# Patient Record
Sex: FEMALE | Race: BLACK OR AFRICAN AMERICAN | NOT HISPANIC OR LATINO | Employment: UNEMPLOYED | URBAN - METROPOLITAN AREA
[De-identification: names, ages, dates, MRNs, and addresses within clinical notes are randomized per-mention and may not be internally consistent; named-entity substitution may affect disease eponyms.]

---

## 2016-11-02 LAB
ABO + RH BLD: NORMAL
ABO + RH BLD: NORMAL
BLD GP AB SCN SERPL QL: NORMAL
BLD GP AB SCN SERPL QL: NORMAL

## 2017-01-12 ENCOUNTER — HOSPITAL ENCOUNTER (OUTPATIENT)
Dept: ULTRASOUND IMAGING | Facility: CLINIC | Age: 42
Discharge: HOME | End: 2017-01-12
Attending: ADVANCED PRACTICE MIDWIFE | Admitting: OBSTETRICS & GYNECOLOGY
Payer: COMMERCIAL

## 2017-01-12 ENCOUNTER — OFFICE VISIT (OUTPATIENT)
Dept: MATERNAL FETAL MEDICINE | Facility: CLINIC | Age: 42
End: 2017-01-12
Attending: ADVANCED PRACTICE MIDWIFE
Payer: COMMERCIAL

## 2017-01-12 DIAGNOSIS — O09.293 HX OF PREECLAMPSIA, PRIOR PREGNANCY, CURRENTLY PREGNANT, THIRD TRIMESTER: ICD-10-CM

## 2017-01-12 DIAGNOSIS — O26.90 PREGNANCY RELATED CONDITION, UNSPECIFIED TRIMESTER: ICD-10-CM

## 2017-01-12 PROCEDURE — 76816 OB US FOLLOW-UP PER FETUS: CPT

## 2017-01-12 NOTE — PROGRESS NOTES
"Please see \"Imaging\" tab under \"Chart Review\" for details of today's US at the [facility].    Mik Cartagena MD  Maternal-Fetal Medicine      "

## 2017-01-13 ENCOUNTER — VIRTUAL VISIT (OUTPATIENT)
Dept: INTERPRETER SERVICES | Facility: CLINIC | Age: 42
End: 2017-01-13

## 2017-01-13 LAB — GLU GEST SCREEN 1HR 50G: 154

## 2017-01-20 LAB
GLU, 1 HOUR, 100 G: 177
GLU, 2 HOUR, 100 G: 109
GLU, 3 HOUR, 100 G: 110
HBV SURFACE AG SERPL QL IA: NORMAL
HIV 1+2 AB+HIV1 P24 AG SERPL QL IA: NORMAL
RUBELLA ANTIBODY IGG QUANTITATIVE: 1.1 IU/ML

## 2017-02-02 ENCOUNTER — OFFICE VISIT (OUTPATIENT)
Dept: MATERNAL FETAL MEDICINE | Facility: CLINIC | Age: 42
End: 2017-02-02
Attending: OBSTETRICS & GYNECOLOGY
Payer: COMMERCIAL

## 2017-02-02 ENCOUNTER — HOSPITAL ENCOUNTER (OUTPATIENT)
Dept: ULTRASOUND IMAGING | Facility: CLINIC | Age: 42
Discharge: HOME | End: 2017-02-02
Attending: OBSTETRICS & GYNECOLOGY | Admitting: OBSTETRICS & GYNECOLOGY
Payer: COMMERCIAL

## 2017-02-02 ENCOUNTER — OFFICE VISIT (OUTPATIENT)
Dept: INTERPRETER SERVICES | Facility: CLINIC | Age: 42
End: 2017-02-02

## 2017-02-02 DIAGNOSIS — O09.293 HX OF PREECLAMPSIA, PRIOR PREGNANCY, CURRENTLY PREGNANT, THIRD TRIMESTER: ICD-10-CM

## 2017-02-02 DIAGNOSIS — O09.523 AMA (ADVANCED MATERNAL AGE) MULTIGRAVIDA 35+, THIRD TRIMESTER: Primary | ICD-10-CM

## 2017-02-02 PROCEDURE — 76816 OB US FOLLOW-UP PER FETUS: CPT

## 2017-02-02 NOTE — PROGRESS NOTES
Please refer to ultrasound report under 'Imaging' Studies of 'Chart Review' tabs.    Chase Carver M.D.

## 2017-02-07 ENCOUNTER — TRANSFERRED RECORDS (OUTPATIENT)
Dept: HEALTH INFORMATION MANAGEMENT | Facility: CLINIC | Age: 42
End: 2017-02-07

## 2017-02-16 ENCOUNTER — TRANSFERRED RECORDS (OUTPATIENT)
Dept: HEALTH INFORMATION MANAGEMENT | Facility: CLINIC | Age: 42
End: 2017-02-16

## 2017-02-24 ENCOUNTER — OFFICE VISIT (OUTPATIENT)
Dept: OBGYN | Facility: CLINIC | Age: 42
End: 2017-02-24
Payer: COMMERCIAL

## 2017-02-24 VITALS
HEART RATE: 95 BPM | BODY MASS INDEX: 35.02 KG/M2 | SYSTOLIC BLOOD PRESSURE: 117 MMHG | HEIGHT: 61 IN | WEIGHT: 185.5 LBS | DIASTOLIC BLOOD PRESSURE: 72 MMHG

## 2017-02-24 DIAGNOSIS — O36.5990 ASYMMETRIC IUGR AFFECTING PREGNANCY, ANTEPARTUM: ICD-10-CM

## 2017-02-24 DIAGNOSIS — O09.523 AMA (ADVANCED MATERNAL AGE) MULTIGRAVIDA 35+, THIRD TRIMESTER: Primary | ICD-10-CM

## 2017-02-24 DIAGNOSIS — O09.93 HIGH-RISK PREGNANCY, THIRD TRIMESTER: Primary | ICD-10-CM

## 2017-02-24 PROBLEM — O99.810 PREGNANCY WITH ABNORMAL GLUCOSE TOLERANCE TEST (GTT): Status: ACTIVE | Noted: 2017-01-16

## 2017-02-24 PROCEDURE — 99212 OFFICE O/P EST SF 10 MIN: CPT | Mod: ZF

## 2017-02-24 PROCEDURE — T1013 SIGN LANG/ORAL INTERPRETER: HCPCS | Mod: U3,ZF

## 2017-02-24 NOTE — MR AVS SNAPSHOT
After Visit Summary   2017    Tricia Tamez    MRN: [id number]           Patient Information     Date Of Birth          1975        Visit Information        Provider Department      2017 2:45 PM Roslyn Corona; Nurse, [facility]        Today's Diagnoses     AMA (advanced maternal age) multigravida 35+, third trimester    -  1    Asymmetric IUGR affecting pregnancy, antepartum           Follow-ups after your visit        Who to contact     Please call your clinic at [phone] to:    Ask questions about your health    Make or cancel appointments    Discuss your medicines    Learn about your test results    Speak to your doctor   If you have compliments or concerns about an experience at your clinic, or if you wish to file a complaint, please contact [facility] Patient Relations at [phone] or email us at [email or web address]         Additional Information About Your Visit        MyChart Information     Delvert is an electronic gateway that provides easy, online access to your medical records. With Battlepro, you can request a clinic appointment, read your test results, renew a prescription or communicate with your care team.     To sign up for Delvert visit the website at [email or web address]   You will be asked to enter the access code listed below, as well as some personal information. Please follow the directions to create your username and password.     Your access code is: [email or web address]  Expires: 10/22/2017  9:03 AM     Your access code will  in 90 days. If you need help or a new code, please contact your [facility] or call [phone] for assistance.        Care EveryWhere ID     This is your Care EveryWhere ID. This could be used by other organizations to access your [place] medical records  [id number]        Your Vitals Were     Last Period                    06/24/2016            Blood Pressure from Last 3 Encounters:   04/18/17 126/75   04/13/17 119/79   04/10/17 123/80    Weight from Last 3 Encounters:   04/13/17 82.6 kg (182 lb)   04/10/17 82.6 kg (182 lb)   04/07/17 82.6 kg (182 lb)              Today, you had the following     No orders found for display       Primary Care Provider Office Phone # Fax #    Lilly Cartwright [phone]       [facility] [address]  [facility] [place] [facility] [address]        Equal Access to Services     ISAIAS GOMEZ : Hadradhika Wilburn, jose eduardo mejia, brady cordova, hubert gutierres . So [facility] [phone].    ATENCIÓN: Si habla español, tiene a lozada disposición servicios gratuitos de asistencia lingüística. Ll[facility] [phone].    We comply with applicable federal civil rights laws and [place] laws. We do not discriminate on the basis of race, color, national origin, age, disability sex, sexual orientation or gender identity.            Thank you!     Thank you for choosing [facility]  for your care. Our goal is always to provide you with excellent care. Hearing back from our patients is one way we can continue to improve our services. Please take a few minutes to complete the written survey that you may receive in the mail after your visit with us. Thank you!             Your Updated Medication List - Protect others around you: Learn how to safely use, store and throw away your medicines at www.disposemymeds.org.          This list is accurate as of: 2/24/17 11:59 PM.  Always use your most recent med list.                   Brand Name Dispense Instructions for use Diagnosis    prenatal multivitamin  plus iron 27-0.8 MG Tabs per tablet     30 tablet    Take 1 tablet by mouth daily    HRP (high risk pregnancy), third trimester       vitamin D 2000 UNITS Caps     90 capsule    Take 1 capsule by mouth daily Take one capsule daily.    Hypovitaminosis D

## 2017-02-24 NOTE — MR AVS SNAPSHOT
After Visit Summary   2/24/2017    Tricia Tamez    MRN: [id number]           Patient Information     Date Of Birth          1975        Visit Information        Provider Department      2/24/2017 3:15 PM Kelsey Corona Carrie Ann, MD [facility]        Today's Diagnoses     High-risk pregnancy, third trimester    -  1       Follow-ups after your visit        Your next 10 appointments already scheduled     Mar 03, 2017 10:15 AM SAVI CARRASCO US COMPRE SINGLE F/U with [id number]   [facility])    [address]  [facility] [id number] [phone]           Wear comfortable clothes and leave your valuables at home.            Mar 03, 2017 10:45 AM SAVI   Radiology MD with INOCENCIO CARRASCO MD   [facility])    [address]  [facility] [address] [phone]           Please arrive at the time given for your first appointment.  This visit is used internally to schedule the physician's time during your ultrasound.              Who to contact     Please call your clinic at [phone] to:    Ask questions about your health    Make or cancel appointments    Discuss your medicines    Learn about your test results    Speak to your doctor   If you have compliments or concerns about an experience at your clinic, or if you wish to file a complaint, please contact [facility] Patient Relations at [phone] or email us at [email or web address]         Additional Information About Your Visit        MyChart Information     [employer] is an electronic gateway that provides easy, online access to your medical records. With [employer] you can request a clinic appointment, read your test results, renew a prescription or communicate with your care team.     To sign up for [employer]  "visit the website at [email or web address]   You will be asked to enter the access code listed below, as well as some personal information. Please follow the directions to create your username and password.     Your access code is: [email or web address]  Expires: 2017  9:00 AM     Your access code will  in 90 days. If you need help or a new code, please contact your [facility] or call [phone] for assistance.        Care EveryWhere ID     This is your Care EveryWhere ID. This could be used by other organizations to access your [place] medical records  [id number]        Your Vitals Were     Pulse Height Last Period BMI (Body Mass Index)          95 1.549 m (5' 1\") 2016 35.05 kg/m2         Blood Pressure from Last 3 Encounters:   17 117/72   05/16/15 (!) 135/93   05/13/15 (!) 145/99    Weight from Last 3 Encounters:   17 84.1 kg (185 lb 8 oz)   05/14/15 83.5 kg (184 lb)   05/13/15 83.4 kg (183 lb 14.4 oz)              We Performed the Following     ABO and Rh     Antibody screen red cell     Glucose tolerance gest screen 1 hour     Glucose tolerance gest std 100 gm 3 hr        Primary Care Provider Office Phone # Fax #    Lilly Gabbie [phone]       [facility] [address] [place] [id number]        Thank you!     Thank you for choosing [facility]  for your care. Our goal is always to provide you with excellent care. Hearing back from our patients is one way we can continue to improve our services. Please take a few minutes to complete the written survey that you may receive in the mail after your visit with us. Thank you!             Your Updated Medication List - Protect others around you: Learn how to safely use, store and throw away your medicines at www.disposemymeds.org.          This list is accurate as of: 17  4:16 PM.  Always use your most recent med list.                   Brand Name " Dispense Instructions for use    * D 2000 2000 UNITS tablet   Generic drug:  cholecalciferol          * vitamin D 2000 UNITS Caps     90 capsule    Take 1 capsule by mouth daily Take one capsule daily.       diphenhydrAMINE 25 MG tablet    BENADRYL         ibuprofen 600 MG tablet    ADVIL/MOTRIN    30 tablet    Take 1 tablet (600 mg) by mouth every 6 hours as needed for moderate pain       * prenatal multivitamin  plus iron 27-0.8 MG Tabs per tablet      Take 1 tablet by mouth daily       * prenatal multivitamin  plus iron 27-0.8 MG Tabs per tablet     30 tablet    Take 1 tablet by mouth daily       * Notice:  This list has 4 medication(s) that are the same as other medications prescribed for you. Read the directions carefully, and ask your doctor or other care provider to review them with you.

## 2017-02-24 NOTE — PROGRESS NOTES
Transfer of care from [facility] at 35+0 for history of mild preeclampsia and grand multiparity.  Doing well. No concerns. States this baby feels larger than last.   Has been having u/s at [facility].   No hypertension this pregnancy  Patient Active Problem List   Diagnosis     High-risk pregnancy     Pregnancy with abnormal glucose tolerance test (GTT)     History of pre-eclampsia     Supervision of pregnancy with insufficient  care     Extreme poverty     Obstetric History       T7      TAB0   SAB0   E0   M0   L6       # Outcome Date GA Lbr Michael/2nd Weight Sex Delivery Anes PTL Lv   9 Current            8 Term 05/14/15 38w3d  2.268 kg (5 lb) F Vag-Spont None  Y      Apgar1:  9                Apgar5: 9   7 AB      AB, COMPLETE      6 Term         ND   5 Term         Y   4 Term         Y   3 Term         Y   2 Term         Y   1 Term         Y        Yokasta Morse

## 2017-02-25 ASSESSMENT — PATIENT HEALTH QUESTIONNAIRE - PHQ9: SUM OF ALL RESPONSES TO PHQ QUESTIONS 1-9: 0

## 2017-03-01 ENCOUNTER — OFFICE VISIT (OUTPATIENT)
Dept: OBGYN | Facility: CLINIC | Age: 42
End: 2017-03-01
Payer: COMMERCIAL

## 2017-03-01 VITALS
HEART RATE: 111 BPM | SYSTOLIC BLOOD PRESSURE: 118 MMHG | DIASTOLIC BLOOD PRESSURE: 76 MMHG | WEIGHT: 186 LBS | BODY MASS INDEX: 35.12 KG/M2 | HEIGHT: 61 IN

## 2017-03-01 DIAGNOSIS — O09.93 HRP (HIGH RISK PREGNANCY), THIRD TRIMESTER: Primary | ICD-10-CM

## 2017-03-01 PROCEDURE — T1013 SIGN LANG/ORAL INTERPRETER: HCPCS | Mod: U3,ZF

## 2017-03-01 PROCEDURE — 87653 STREP B DNA AMP PROBE: CPT | Performed by: OBSTETRICS & GYNECOLOGY

## 2017-03-01 PROCEDURE — 99212 OFFICE O/P EST SF 10 MIN: CPT | Mod: ZF

## 2017-03-01 ASSESSMENT — PAIN SCALES - GENERAL: PAINLEVEL: NO PAIN (0)

## 2017-03-01 NOTE — PROGRESS NOTES
"[facility] Clinic Visit  Return OBGYN    S:  Tricia Tamez is a 41 year old  at 35w5d by LMP c/w 20w3d US. She is a transfer of care from [facility] at 35 weeks for mild preeclampsia and grand multiparity. Pregnancy is complicated by above as well as AMA, failure of GCT but passed GTT, and latent TB history. She feels well today. She is accompanied by her  and has a Panamanian  present. She is feeling well. She denies any contractions, vaginal bleeding, loss of fluid, and has good fetal movement. She denies any concerning headaches, visual changes, or RUQ pain. Has been having u/s at [facility] and no hypertension this pregnancy. Of note, patient has not been taking baby aspirin as discussed previously.    PMH:  History of Preeclampsia  Grand multiparity  AMA  Failed GCT, Passed GTT  Latent TB- T spot+ on 10/2013 treated for 2 months only  Poverty    PSH:  Denies    Meds:  Iron and prenatal vitamin    Patient Active Problem List   Diagnosis     High-risk pregnancy     Pregnancy with abnormal glucose tolerance test (GTT)     History of pre-eclampsia     Supervision of pregnancy with insufficient  care     Extreme poverty     OB:  As per patient report, she has had 6 term vaginal deliveries with the last in 2015 delivered here    O:  Vitals:    17 1336   BP: 118/76   BP Location: Right arm   Patient Position: Chair   Cuff Size: Adult Regular   Pulse: 111   Weight: 84.4 kg (186 lb)   Height: 1.549 m (5' 1\")     Gen- NAD  Abdomen- FHTs 140s, FH 36cm  External genitalia- appears normal with physiologic appearing discharge of pregnancy    Prenatal labs:  2016: HIV NR, Hep B S Ag NR, Gonorrhea/chlamydia negative, AB+, negative antibody, RI, Hep B Immune, Neg RPR, baseline HELP labs normal. Failed GCT but passed GTT    US: Last growht US with [facility] at 47%    A/P:  Tricia Tamez is a 41 year old  at 35w5d by LMP c/w 20w3d US who is a transfer of care from [facility] at 35 weeks for " mild preeclampsia and grand multiparity. Pregnancy is complicated by above as well as AMA, failure of GCT but passed GTT, and latent TB history.    - GBS collected  - Return in 1 week for weekly OBGYN appointments until delivery  - Prenatal labs- as above. Scheduled for growth ultrasound on 3/3/17 and she was encouraged to keep this appointment  - History of mild preeclampsia- has not been taking ASA and declines taking it now. Benefits of this were discussed with her partner and her to help prevent preeclampsia; however, it was still declined. Patient is normotensive today  - Grand multiparity- would type and cross on admission to labor and delivery. She denies any history of PPH  - s/p flu and Tdap    Rhodareshma Juan MD   OBGYN, PGY-3  3/1/2017 1:39 PM    I agree with note as above.  Assessment and plan were jointly made.  Alana Beckford MD

## 2017-03-01 NOTE — MR AVS SNAPSHOT
After Visit Summary   3/1/2017    Tricia Tamez    MRN: [id number]           Patient Information     Date Of Birth          1975        Visit Information        Provider Department      3/1/2017 1:30 PM Roslyn Corona; Rhoda Juan MD [facility]        Today's Diagnoses     Acute posthemorrhagic anemia    -  1       Follow-ups after your visit        Follow-up notes from your care team     Return in about 1 week (around 3/8/2017) for Return OB visit.      Your next 10 appointments already scheduled     Mar 03, 2017 10:00 AM CST   [place] [facility] with [employer]    Services Department ([facility])    [address] [facility] [address]               Mar 03, 2017 10:15 AM CST   MFM US COMPRE SINGLE F/U with IVON   [facility] [place] ([facility])    [address]  [facility] [id number] [phone]           Wear comfortable clothes and leave your valuables at home.            Mar 03, 2017 10:45 AM CST   Radiology MD with UR DANILO LIRA   [facility] [place] ([facility])    [address]  [facility] [id number] [phone]           Please arrive at the time given for your first appointment.  This visit is used internally to schedule the physician's time during your ultrasound.            Mar 07, 2017 11:30 AM CST   RETURN OB with Beryl Wan MD   [facility] ([facility])    [place] [facility] [address] [address]  [address]  [facility] [address]   [phone]            Mar 15, 2017  2:00 PM CDT   RETURN OB with Beryl Wan MD   [facility] ([facility])    [place] [facility] [address]  [address]  [facility]  "[phone]            Mar 22, 2017  2:15 PM CDT   RETURN OB with Alana Beckford MD   [facility] ([facility])    [place] [facility] [address] [address]  [address]  [facility] [id number] [phone]              Who to contact     Please call your clinic at [phone] to:    Ask questions about your health    Make or cancel appointments    Discuss your medicines    Learn about your test results    Speak to your doctor   If you have compliments or concerns about an experience at your clinic, or if you wish to file a complaint, please contact [facility] Patient Relations at [phone] or email us at [email or web address]         Additional Information About Your Visit        [facility] Healthcare[facility]ISSOID Information     StreamOcean is an electronic gateway that provides easy, online access to your medical records. With StreamOcean, you can request a clinic appointment, read your test results, renew a prescription or communicate with your care team.     To sign up for StreamOcean visit the website at [email or web address]   You will be asked to enter the access code listed below, as well as some personal information. Please follow the directions to create your username and password.     Your access code is: [email or web address]  Expires: 2017  9:00 AM     Your access code will  in 90 days. If you need help or a new code, please contact your [facility] or call [phone] for assistance.        Care EveryWhere ID     This is your Care EveryWhere ID. This could be used by other organizations to access your [place] medical records  [id number]        Your Vitals Were     Pulse Height Last Period BMI (Body Mass Index)          111 1.549 m (5' 1\") 2016 35.14 kg/m2         Blood Pressure from Last 3 Encounters:   17 118/76   17 117/72   05/16/15 (!) 135/93    Weight from Last 3 " Encounters:   03/01/17 84.4 kg (186 lb)   02/24/17 84.1 kg (185 lb 8 oz)   05/14/15 83.5 kg (184 lb)              Today, you had the following     No orders found for display       Primary Care Provider Office Phone # Fax #    Lilly Cartwright [phone]       [facility] [address]  [facility] [address]        Thank you!     Thank you for choosing [facility]  for your care. Our goal is always to provide you with excellent care. Hearing back from our patients is one way we can continue to improve our services. Please take a few minutes to complete the written survey that you may receive in the mail after your visit with us. Thank you!             Your Updated Medication List - Protect others around you: Learn how to safely use, store and throw away your medicines at www.disposemymeds.org.          This list is accurate as of: 3/1/17  2:08 PM.  Always use your most recent med list.                   Brand Name Dispense Instructions for use    prenatal multivitamin  plus iron 27-0.8 MG Tabs per tablet     30 tablet    Take 1 tablet by mouth daily       vitamin D 2000 UNITS Caps     90 capsule    Take 1 capsule by mouth daily Take one capsule daily.

## 2017-03-02 LAB
GP B STREP DNA SPEC QL NAA+PROBE: NORMAL
SPECIMEN SOURCE: NORMAL

## 2017-03-03 ENCOUNTER — HOSPITAL ENCOUNTER (OUTPATIENT)
Dept: ULTRASOUND IMAGING | Facility: CLINIC | Age: 42
Discharge: HOME | End: 2017-03-03
Attending: OBSTETRICS & GYNECOLOGY | Admitting: OBSTETRICS & GYNECOLOGY
Payer: COMMERCIAL

## 2017-03-03 ENCOUNTER — OFFICE VISIT (OUTPATIENT)
Dept: MATERNAL FETAL MEDICINE | Facility: CLINIC | Age: 42
End: 2017-03-03
Attending: OBSTETRICS & GYNECOLOGY
Payer: COMMERCIAL

## 2017-03-03 DIAGNOSIS — O09.293 HX OF PREECLAMPSIA, PRIOR PREGNANCY, CURRENTLY PREGNANT, THIRD TRIMESTER: ICD-10-CM

## 2017-03-03 DIAGNOSIS — Z03.73 SUSPECTED FETAL ANOMALY NOT FOUND: Primary | ICD-10-CM

## 2017-03-03 DIAGNOSIS — O09.523 AMA (ADVANCED MATERNAL AGE) MULTIGRAVIDA 35+, THIRD TRIMESTER: ICD-10-CM

## 2017-03-03 PROCEDURE — 76819 FETAL BIOPHYS PROFIL W/O NST: CPT | Performed by: OBSTETRICS & GYNECOLOGY

## 2017-03-03 PROCEDURE — 76816 OB US FOLLOW-UP PER FETUS: CPT

## 2017-03-03 NOTE — PROGRESS NOTES
"Please see \"Imaging\" tab under \"Chart Review\" for details of today's US at the [facility].    Mik Cartagena MD  Maternal-Fetal Medicine      "

## 2017-03-03 NOTE — MR AVS SNAPSHOT
After Visit Summary   3/3/2017    Tricia Tamez    MRN: [id number]           Patient Information     Date Of Birth          1975        Visit Information        Provider Department      3/3/2017 10:45 AM Mik Cartagena MD [facility] Maternal Fetal Medicine [facility]        Today's Diagnoses     Suspected fetal anomaly not found    -  1       Follow-ups after your visit        Your next 10 appointments already scheduled     Mar 03, 2017 10:45 AM CST   Radiology MD with Mik Cartagena MD   [facility] Maternal Fetal Medicine [facility] ([facility])    [address]  [facility] [address]   [phone]           Please arrive at the time given for your first appointment.  This visit is used internally to schedule the physician's time during your ultrasound.            Mar 07, 2017 11:30 AM CST   RETURN OB with Beryl Wan MD   [facility] ([facility])    [place] [facility] [address] [address]  [address]  [facility] [address]   [phone]            Mar 15, 2017  2:00 PM CDT   RETURN OB with Beryl Wan MD   [facility] ([facility])    [place] [facility]  [address]  [facility] [address]   [phone]            Mar 22, 2017  2:15 PM CDT   RETURN OB with Alana Beckford MD   [facility] ([facility])    [place] [facility] [address] [address]  [address]  [facility] [address]   [phone]              Future tests that were ordered for you today     Open Future Orders        Priority Expected Expires Ordered    Maternal Fetal BPP Single Routine 3/7/2017 3/3/2018 3/3/2017    Maternal Fetal BPP Single Routine 3/10/2017 3/3/2018 3/3/2017    Maternal Fetal BPP Single Routine  3/3/2018 3/3/2017    Maternal Fetal BPP Single Routine  3/3/2018 3/3/2017    Maternal Fetal BPP  "Single Routine  3/3/2018 3/3/2017    Maternal Fetal BPP Single Routine  3/3/2018 3/3/2017            Who to contact     If you have questions or need follow up information about today's clinic visit or your schedule please contact [facility]ERNAL FETAL MEDICINE [facility] directly at [phone].  Normal or non-critical lab and imaging results will be communicated to you by MyChart, letter or phone within 4 business days after the clinic has received the results. If you do not hear from us within 7 days, please contact the clinic through CityGrohart or phone. If you have a critical or abnormal lab result, we will notify you by phone as soon as possible.  Submit refill requests through [email or web address] or call your pharmacy and they will forward the refill request to us. Please allow 3 business days for your refill to be completed.          Additional Information About Your Visit        CityGrohart Information     [email or web address] lets you send messages to your doctor, view your test results, renew your prescriptions, schedule appointments and more. To sign up, go to [email or web address] . Click on \"Log in\" on the left side of the screen, which will take you to the Welcome page. Then click on \"Sign up Now\" on the right side of the page.     You will be asked to enter the access code listed below, as well as some personal information. Please follow the directions to create your username and password.     Your access code is: [email or web address]  Expires: 2017  9:00 AM     Your access code will  in 90 days. If you need help or a new code, please call your [place] clinic or [phone].        Care EveryWhere ID     This is your Care EveryWhere ID. This could be used by other organizations to access your [place] medical records  [id number]        Your Vitals Were     Last Period                   2016            Blood Pressure from Last 3 Encounters:   17 118/76   17 117/72   05/16/15 (!) 135/93    Weight from " Last 3 Encounters:   03/01/17 84.4 kg (186 lb)   02/24/17 84.1 kg (185 lb 8 oz)   05/14/15 83.5 kg (184 lb)               Primary Care Provider Office Phone # Fax #    Lilly Cartwright [phone]       [facility] [address]  [facility] [address]        Thank you!     Thank you for choosing [facility]  for your care. Our goal is always to provide you with excellent care. Hearing back from our patients is one way we can continue to improve our services. Please take a few minutes to complete the written survey that you may receive in the mail after your visit with us. Thank you!             Your Updated Medication List - Protect others around you: Learn how to safely use, store and throw away your medicines at www.disposemymeds.org.          This list is accurate as of: 3/3/17 10:45 AM.  Always use your most recent med list.                   Brand Name Dispense Instructions for use    prenatal multivitamin  plus iron 27-0.8 MG Tabs per tablet     30 tablet    Take 1 tablet by mouth daily       vitamin D 2000 UNITS Caps     90 capsule    Take 1 capsule by mouth daily Take one capsule daily.

## 2017-03-07 ENCOUNTER — OFFICE VISIT (OUTPATIENT)
Dept: OBGYN | Facility: CLINIC | Age: 42
End: 2017-03-07
Attending: OBSTETRICS & GYNECOLOGY
Payer: COMMERCIAL

## 2017-03-07 VITALS
WEIGHT: 183.9 LBS | HEIGHT: 61 IN | HEART RATE: 132 BPM | BODY MASS INDEX: 34.72 KG/M2 | SYSTOLIC BLOOD PRESSURE: 132 MMHG | DIASTOLIC BLOOD PRESSURE: 74 MMHG

## 2017-03-07 DIAGNOSIS — O36.5990 ASYMMETRIC IUGR AFFECTING PREGNANCY, ANTEPARTUM: ICD-10-CM

## 2017-03-07 DIAGNOSIS — O09.93 HRP (HIGH RISK PREGNANCY), THIRD TRIMESTER: Primary | ICD-10-CM

## 2017-03-07 PROCEDURE — 99212 OFFICE O/P EST SF 10 MIN: CPT | Mod: ZF

## 2017-03-07 PROCEDURE — T1013 SIGN LANG/ORAL INTERPRETER: HCPCS | Mod: U3,ZF

## 2017-03-07 PROCEDURE — 76819 FETAL BIOPHYS PROFIL W/O NST: CPT | Mod: ZF

## 2017-03-07 NOTE — LETTER
3/7/2017       RE: Tricia Tamez  [address]  [facility] [address]     Dear Colleague,    Thank you for referring your patient, Tricia Tamez, to the [facility] at [facility]. Please see a copy of my visit note below.    41 year old,  , presents at 36 4/7 weeks in pregnancy complicated by asymmetric IUGR, AMA for biophysical assessment.    Fetal Breathing Movements (FBM): Normal - 2  Gross Body Movements (GBM): Normal - 2  Fetal Tone (FT): Normal - 2  AFV: Pocket of amniotic fluid > or = to 2 cm x 2 cm - 2  Quantitative Amniotic Fluid Volume Total: 11.8 cm      BPP 8/8.  UAR = 1.93 Normal.   MCA Not done.  NST Not done.     SIngle fetus in cephalic presentation.  Placenta anterior and grade 1.    Recommend continued twice weekly BPP w/ doppler assessments.    ORIANA Wilcox MD, FACOG  [facility]  OB/GYN    3/7/2017  3:16 PM        Again, thank you for allowing me to participate in the care of your patient.      Sincerely,    [place] [employer]

## 2017-03-07 NOTE — MR AVS SNAPSHOT
After Visit Summary   3/7/2017    Tricia Tamez    MRN: [id number]           Patient Information     Date Of Birth          1975        Visit Information        Provider Department      3/7/2017 1:30 PM [place] [employer]; [facility] ULTRASOUND II [facility]        Today's Diagnoses     Asymmetric IUGR affecting pregnancy, antepartum           Follow-ups after your visit        Your next 10 appointments already scheduled     Mar 14, 2017 11:00 AM CDT   ULTRASOUND with [facility] ULTRASOUND II   [facility] ([facility])    [place] [facility]  [facility] [address]   [phone]            Mar 15, 2017  2:00 PM CDT   RETURN OB with Beryl Wan MD   [facility] ([facility])    [place] [facility] [address]  [address]  [facility] [address]   [phone]            Mar 17, 2017 11:00 AM CDT   ULTRASOUND with [facility] ULTRASOUND   [facility] ([facility])    [place] [facility] [address]  [address]  [facility] [address]   [phone]            Mar 21, 2017 10:00 AM CDT   RETURN OB with Alana Brito MD   [facility] ([facility])    [place] [facility] [address]  [address]  [facility] [address]   [phone]            Mar 21, 2017 11:00 AM CDT   ULTRASOUND with [facility] ULTRASOUND   [facility] ([facility])    [place] [facility] [address]  [address]  [facility] [address]   [phone]            Mar 22, 2017  2:15 PM CDT   RETURN OB with Alana Beckford MD   [facility] ([facility])    [place] [facility] [address]  [address]  [facility] [address]   [phone]            Mar 24, 2017 11:00  AM CDT   ULTRASOUND with [facility] ULTRASOUND   [facility] ([facility])    [place] [facility] [address]  [address]  [facility] [address] [phone]            Mar 28, 2017 11:00 AM CDT   ULTRASOUND with [facility] ULTRASOUND   [facility] ([facility])    [place] [facility] [address]  [address]  [facility] [address]   [phone]            Mar 28, 2017 11:30 AM CDT   RETURN OB with Rika Vu MD   [facility] ([facility])    [place] [facility] [address]  [address]  [facility] [address] [phone]            Mar 31, 2017 11:00 AM CDT   ULTRASOUND with [facility] ULTRASOUND   [facility] ([facility])    [place] [facility] [address]  [address]  [facility] [address] [phone]              Who to contact     Please call your clinic at [phone] to:    Ask questions about your health    Make or cancel appointments    Discuss your medicines    Learn about your test results    Speak to your doctor   If you have compliments or concerns about an experience at your clinic, or if you wish to file a complaint, please contact [facility] Patient Relations at [phone] or email us at [email or web address]         Additional Information About Your Visit        Nimsofthart Information     [employer] is an electronic gateway that provides easy, online access to your medical records. With [employer], you can request a clinic appointment, read your test results, renew a prescription or communicate with your care team.     To sign up for [employer] visit the website at [email or web address]   You will be asked to enter the access code listed below, as well as some personal information. Please follow the directions to create your username and  password.     Your access code is: [email or web address]  Expires: 2017  9:00 AM     Your access code will  in 90 days. If you need help or a new code, please contact your [facility] or call [phone] for assistance.        Care EveryWhere ID     This is your Care EveryWhere ID. This could be used by other organizations to access your [place] medical records  [id number]        Your Vitals Were     Last Period                   2016            Blood Pressure from Last 3 Encounters:   17 132/74   17 118/76   17 117/72    Weight from Last 3 Encounters:   17 83.4 kg (183 lb 14.4 oz)   17 84.4 kg (186 lb)   17 84.1 kg (185 lb 8 oz)              We Performed the Following     BPP (Single) w/out NST (In Clinic)        Primary Care Provider Office Phone # Fax #    Lilly Cartwright [phone]       [address] [address]        Thank you!     Thank you for choosing [facility]  for your care. Our goal is always to provide you with excellent care. Hearing back from our patients is one way we can continue to improve our services. Please take a few minutes to complete the written survey that you may receive in the mail after your visit with us. Thank you!             Your Updated Medication List - Protect others around you: Learn how to safely use, store and throw away your medicines at www.disposemymeds.org.          This list is accurate as of: 3/7/17  2:13 PM.  Always use your most recent med list.                   Brand Name Dispense Instructions for use    prenatal multivitamin  plus iron 27-0.8 MG Tabs per tablet     30 tablet    Take 1 tablet by mouth daily       vitamin D 2000 UNITS Caps     90 capsule    Take 1 capsule by mouth daily Take one capsule daily.

## 2017-03-07 NOTE — MR AVS SNAPSHOT
After Visit Summary   3/7/2017    Tricia Tamez    MRN: [id number]           Patient Information     Date Of Birth          1975        Visit Information        Provider Department      3/7/2017 11:15 AM Roslyn Corona; Beryl Wna MD [facility]        Today's Diagnoses     Asymmetric IUGR affecting pregnancy, antepartum    -  1       Follow-ups after your visit        Follow-up notes from your care team     Return in about 1 week (around 3/14/2017) for ROBERTO.      Your next 10 appointments already scheduled     Mar 07, 2017  1:30 PM CST   ULTRASOUND with Assignments Open, [facility] ULTRASOUND II   [facility] ([facility])    [place] [facility] [address]  [address]  [facility] [address] [phone]            Mar 15, 2017  1:00 PM CDT   ULTRASOUND with [facility] ULTRASOUND   [facility] ([facility])    [place] [facility] [address]  [address]  [facility] [address] [phone]            Mar 15, 2017  2:00 PM CDT   RETURN OB with Beryl Wan MD   [facility] ([facility])    [place] [facility] [address]  [address]  [facility] [address] [phone]            Mar 22, 2017 11:00 AM CDT   ULTRASOUND with [facility] ULTRASOUND   [facility] ([facility])    [place] [facility] [address]  [address]  [facility] [address] [phone]            Mar 22, 2017  2:15 PM CDT   RETURN OB with Alana Beckford MD   [facility] ([facility])    [place] [facility] [address]  [address]  [facility] [address] [phone]              Future tests that were ordered for you today     Open Future Orders        Priority Expected Expires Ordered    BPP (Single) w/out NST (In Clinic)  "Routine 2017 2017 3/7/2017            Who to contact     Please call your clinic at [phone] to:    Ask questions about your health    Make or cancel appointments    Discuss your medicines    Learn about your test results    Speak to your doctor   If you have compliments or concerns about an experience at your clinic, or if you wish to file a complaint, please contact [facility] Patient Relations at [phone] or email us at [email or web address]         Additional Information About Your Visit        [employer] Information     [employer] is an electronic gateway that provides easy, online access to your medical records. With [employer], you can request a clinic appointment, read your test results, renew a prescription or communicate with your care team.     To sign up for [employer] visit the website at [email or web address]   You will be asked to enter the access code listed below, as well as some personal information. Please follow the directions to create your username and password.     Your access code is: [email or web address]  Expires: 2017  9:00 AM     Your access code will  in 90 days. If you need help or a new code, please contact your [facility] or call [phone] for assistance.        Care EveryWhere ID     This is your Care EveryWhere ID. This could be used by other organizations to access your [place] medical records  [id number]        Your Vitals Were     Pulse Height Last Period Breastfeeding? BMI (Body Mass Index)       132 1.549 m (5' 1\") 2016 No 34.75 kg/m2        Blood Pressure from Last 3 Encounters:   17 132/74   17 118/76   17 117/72    Weight from Last 3 Encounters:   17 83.4 kg (183 lb 14.4 oz)   17 84.4 kg (186 lb)   17 84.1 kg (185 lb 8 oz)               Primary Care Provider Office Phone # Fax #    Lilly Cartwright [phone]       [facility] " [address] [facility] [address]        Thank you!     Thank you for choosing [facility]  for your care. Our goal is always to provide you with excellent care. Hearing back from our patients is one way we can continue to improve our services. Please take a few minutes to complete the written survey that you may receive in the mail after your visit with us. Thank you!             Your Updated Medication List - Protect others around you: Learn how to safely use, store and throw away your medicines at www.disposemymeds.org.          This list is accurate as of: 3/7/17 12:14 PM.  Always use your most recent med list.                   Brand Name Dispense Instructions for use    prenatal multivitamin  plus iron 27-0.8 MG Tabs per tablet     30 tablet    Take 1 tablet by mouth daily       vitamin D 2000 UNITS Caps     90 capsule    Take 1 capsule by mouth daily Take one capsule daily.

## 2017-03-07 NOTE — PROGRESS NOTES
41 year old,  , presents at 36 4/7 weeks in pregnancy complicated by asymmetric IUGR, AMA for biophysical assessment.    Fetal Breathing Movements (FBM): Normal - 2  Gross Body Movements (GBM): Normal - 2  Fetal Tone (FT): Normal - 2  AFV: Pocket of amniotic fluid > or = to 2 cm x 2 cm - 2  Quantitative Amniotic Fluid Volume Total: 11.8 cm      BPP 8/8.  UAR = 1.93 Normal.   MCA Not done.  NST Not done.     SIngle fetus in cephalic presentation.  Placenta anterior and grade 1.    Recommend continued twice weekly BPP w/ doppler assessments.    ORIANA Wilcox MD, FACOG  [facility]  OB/GYN    3/7/2017  3:16 PM

## 2017-03-07 NOTE — NURSING NOTE
Chief Complaint   Patient presents with     Prenatal Care     36w4d       See CITLALY Dias 3/7/2017

## 2017-03-07 NOTE — PROGRESS NOTES
"Overall doing well. Reports good fetal movement, no cramping, bleeding, leaking fluid. Denies headache.   O: /74 (BP Location: Left arm, Patient Position: Chair)  Pulse 132  Ht 1.549 m (5' 1\")  Wt 83.4 kg (183 lb 14.4 oz)  LMP 2016  Breastfeeding? No  BMI 34.75 kg/m2  A/P: 41 year old  @36w4d with asymmetric IUGR, borderline oligo and h/p preeclampsia  Reviewed IUGR, low fluid, recommend IOL at 37 weeks. Discussed risks of IUFD with patient. She declines IOL, \"I'll wait for labor, its up to God.\" BPP today, needs 2x/week BPP. Reviewed kick counts.   Beryl Wan MD    "

## 2017-03-07 NOTE — LETTER
"3/7/2017       RE: Tricia Tamez   [address]  [facility] [address]     Dear Colleague,    Thank you for referring your patient, Tricia Tamez, to the [facility] at [facility]. Please see a copy of my visit note below.    Overall doing well. Reports good fetal movement, no cramping, bleeding, leaking fluid. Denies headache.   O: /74 (BP Location: Left arm, Patient Position: Chair)  Pulse 132  Ht 1.549 m (5' 1\")  Wt 83.4 kg (183 lb 14.4 oz)  LMP 2016  Breastfeeding? No  BMI 34.75 kg/m2  A/P: 41 year old  @36w4d with asymmetric IUGR, borderline oligo and h/p preeclampsia  Reviewed IUGR, low fluid, recommend IOL at 37 weeks. Discussed risks of IUFD with patient. She declines IOL, \"I'll wait for labor, its up to God.\" BPP today, needs 2x/week BPP. Reviewed kick counts.   Beryl Wan MD        "

## 2017-03-14 ENCOUNTER — OFFICE VISIT (OUTPATIENT)
Dept: OBGYN | Facility: CLINIC | Age: 42
End: 2017-03-14
Attending: OBSTETRICS & GYNECOLOGY
Payer: COMMERCIAL

## 2017-03-14 DIAGNOSIS — O09.523 AMA (ADVANCED MATERNAL AGE) MULTIGRAVIDA 35+, THIRD TRIMESTER: Primary | ICD-10-CM

## 2017-03-14 DIAGNOSIS — O36.5990 ASYMMETRIC IUGR AFFECTING PREGNANCY, ANTEPARTUM: ICD-10-CM

## 2017-03-14 PROCEDURE — 76820 UMBILICAL ARTERY ECHO: CPT | Mod: ZF

## 2017-03-14 PROCEDURE — 76819 FETAL BIOPHYS PROFIL W/O NST: CPT | Mod: ZF

## 2017-03-14 NOTE — PROGRESS NOTES
41 year old,  , presents at 37 4/7 weeks in pregnancy complicated by asymmetric IUGR, AMA for biophysical assessment.    Fetal Breathing Movements (FBM): Normal - 2  Gross Body Movements (GBM): Normal - 2  Fetal Tone (FT): Normal - 2  AFV: Pocket of amniotic fluid > or = to 2 cm x 2 cm - 2  Quantitative Amniotic Fluid Volume Total: 11.9 cm      BPP 8/8.  UAR = 2.14 Normal.   MCA Not done.  NST Not done.     Single fetus in cephalic presentation.  Placenta anterior and grade 1.    Recommend twice weekly BPPs and dopplers and serial growth u/s until delivered. Delivery has been recommended at 37 weeks and will continue to be recommended.     ORIANA Wilcox MD, FACOG  [facility]  OB/GYN    3/14/2017  1:51 PM

## 2017-03-14 NOTE — MR AVS SNAPSHOT
After Visit Summary   3/14/2017    Tricia Tamez    MRN: [id number]           Patient Information     Date Of Birth          1975        Visit Information        Provider Department      3/14/2017 10:45 AM Open, Assignments; [facility] ULTRASOUND II  Services Department        Today's Diagnoses     AMA (advanced maternal age) multigravida 35+, third trimester    -  1    Asymmetric IUGR affecting pregnancy, antepartum           Follow-ups after your visit        Your next 10 appointments already scheduled     Mar 15, 2017  1:45 PM CDT   RETURN OB with Beryl Wan MD, Assignments Open   [facility] ([facility])    [place] [facility] [address]  [address]  [facility] [address] [phone]            Mar 17, 2017 11:00 AM CDT   ULTRASOUND with [facility] ULTRASOUND   [facility] ([facility])    [place] [facility] [address]  [address]  [facility] [address] [phone]            Mar 21, 2017 10:00 AM CDT   RETURN OB with Alana Brito MD   [facility] ([facility])    [place] [facility] [address]  [address]  [facility] [address] [phone]            Mar 21, 2017 11:00 AM CDT   ULTRASOUND with [facility] ULTRASOUND   [facility] ([facility])    [place] [facility] [address]  [address]  [facility] [address] [phone]            Mar 22, 2017  2:15 PM CDT   RETURN OB with Alana Beckford MD   [facility] ([facility])    [place] [facility] [address]  [address]  [facility] [address] [phone]            Mar 24, 2017 11:00 AM CDT   ULTRASOUND with [facility] ULTRASOUND   [facility] ([facility])    [place] [facility] [address]  [address]  [facility] [address]   [phone]            Mar 28, 2017 11:00 AM CDT   ULTRASOUND with [facility] ULTRASOUND   [facility] ([facility])    [place] [facility] [address]  [address]  [facility] [address]   [phone]            Mar 28, 2017 11:30 AM CDT   RETURN OB with Rika Vu MD   [facility] ([facility])    [place] [facility] [address]  [address]  [facility] [address]   [phone]            Mar 31, 2017 11:00 AM CDT   ULTRASOUND with [facility] ULTRASOUND   [facility] ([facility])    [place] [facility] [address]  [address]  [facility] [address]   [phone]              Future tests that were ordered for you today     Open Standing Orders        Priority Remaining Interval Expires Ordered    BPP (Single) w/out NST (In Clinic) Routine 5/6  7/12/2017 3/14/2017    OB Doppler - UAR (Single)  (In Clinic) Routine 5/6  7/12/2017 3/14/2017            Who to contact     Please call your clinic at [phone] to:    Ask questions about your health    Make or cancel appointments    Discuss your medicines    Learn about your test results    Speak to your doctor   If you have compliments or concerns about an experience at your clinic, or if you wish to file a complaint, please contact [facility] Phys[facility] Patient Relations at [phone] or email us at [email or web address]         Additional Information About Your Visit        [employer] Information     [employer] is an electronic gateway that provides easy, online access to your medical records. With [employer], you can request a clinic appointment, read your test results, renew a prescription or communicate with your care team.     To sign up for [employer] visit the website at [email or web address]   You will be asked to enter the access  code listed below, as well as some personal information. Please follow the directions to create your username and password.     Your access code is: [email or web address]  Expires: 2017 10:00 AM     Your access code will  in 90 days. If you need help or a new code, please contact your [facility] Clinic or call [phone] for assistance.        Care EveryWhere ID     This is your Care EveryWhere ID. This could be used by other organizations to access your [place] medical records  [id number]        Your Vitals Were     Last Period                   2016            Blood Pressure from Last 3 Encounters:   17 132/74   17 118/76   17 117/72    Weight from Last 3 Encounters:   17 83.4 kg (183 lb 14.4 oz)   17 84.4 kg (186 lb)   17 84.1 kg (185 lb 8 oz)              We Performed the Following     BPP (Single) w/out NST (In Clinic)     OB Doppler - UAR (Single)  (In Clinic)        Primary Care Provider Office Phone # Fax #    Lilly Gabbie [phone]       [address] [facility] [address]        Thank you!     Thank you for choosing [facility]  for your care. Our goal is always to provide you with excellent care. Hearing back from our patients is one way we can continue to improve our services. Please take a few minutes to complete the written survey that you may receive in the mail after your visit with us. Thank you!             Your Updated Medication List - Protect others around you: Learn how to safely use, store and throw away your medicines at www.disposemymeds.org.          This list is accurate as of: 3/14/17  1:52 PM.  Always use your most recent med list.                   Brand Name Dispense Instructions for use    prenatal multivitamin  plus iron 27-0.8 MG Tabs per tablet     30 tablet    Take 1 tablet by mouth daily       vitamin D 2000 UNITS Caps     90 capsule    Take 1  capsule by mouth daily Take one capsule daily.

## 2017-03-14 NOTE — LETTER
3/14/2017     RE: Tricia Tamez  [address]  [facility] [address]     Dear Colleague,    Thank you for referring your patient, Tricia Tamez, to the [facility] at [facility]. Please see a copy of my visit note below.    41 year old,  , presents at 37 4/7 weeks in pregnancy complicated by asymmetric IUGR, AMA for biophysical assessment.    Fetal Breathing Movements (FBM): Normal - 2  Gross Body Movements (GBM): Normal - 2  Fetal Tone (FT): Normal - 2  AFV: Pocket of amniotic fluid > or = to 2 cm x 2 cm - 2  Quantitative Amniotic Fluid Volume Total: 11.9 cm      BPP 8/8.  UAR = 2.14 Normal.   MCA Not done.  NST Not done.     Single fetus in cephalic presentation.  Placenta anterior and grade 1.    Recommend twice weekly BPPs and dopplers and serial growth u/s until delivered. Delivery has been recommended at 37 weeks and will continue to be recommended.     ORIANA Wilcox MD, FACOG  [facility]  OB/GYN    3/14/2017  1:51 PM

## 2017-03-15 ENCOUNTER — OFFICE VISIT (OUTPATIENT)
Dept: OBGYN | Facility: CLINIC | Age: 42
End: 2017-03-15
Attending: OBSTETRICS & GYNECOLOGY
Payer: COMMERCIAL

## 2017-03-15 VITALS
SYSTOLIC BLOOD PRESSURE: 114 MMHG | HEIGHT: 61 IN | BODY MASS INDEX: 35.17 KG/M2 | WEIGHT: 186.3 LBS | HEART RATE: 132 BPM | DIASTOLIC BLOOD PRESSURE: 75 MMHG

## 2017-03-15 DIAGNOSIS — O09.93 HIGH-RISK PREGNANCY, THIRD TRIMESTER: Primary | ICD-10-CM

## 2017-03-15 PROCEDURE — 99212 OFFICE O/P EST SF 10 MIN: CPT | Mod: ZF

## 2017-03-15 NOTE — LETTER
"3/15/2017       RE: Tricia Tamez   [address]  [facility] [address]     Dear Colleague,    Thank you for referring your patient, Tricia Tamez, to the [facility] at [facility]. Please see a copy of my visit note below.    Doing well. Good movement, no ctx, lof, vb. Denies headache.   O: /75 (BP Location: Left arm, Patient Position: Chair, Cuff Size: Adult Large)  Pulse 132  Ht 1.549 m (5' 1\")  Wt 84.5 kg (186 lb 4.8 oz)  LMP 2016  Breastfeeding? No  BMI 35.2 kg/m2  A/P: 41 year old  @37w5d HRP  Asymmetric IUGR: Previously with borderline oligo, now normal. Declines IOL, BPP yesterday normal. Is doing 2x/week testing. Reviewed kick counting. Discussed IOL if fluid drops.   H/o preeclampsia: Normal BP, no symptoms. Continue to monitor.   RTC 1 week  Beryl Wan MD      "

## 2017-03-15 NOTE — MR AVS SNAPSHOT
After Visit Summary   3/15/2017    Tricia Tamez    MRN: [id number]           Patient Information     Date Of Birth          1975        Visit Information        Provider Department      3/15/2017 1:45 PM Beryl Wan MD; LANGUAGE [facility]        Today's Diagnoses     High-risk pregnancy, third trimester    -  1       Follow-ups after your visit        Follow-up notes from your care team     Return in about 1 week (around 3/22/2017).      Your next 10 appointments already scheduled     Mar 17, 2017 10:45 AM CDT   [facility] LANGUAGE SERVICES   [facility] ([facility])    [facility] [address]  [facility] [address] [phone]            Mar 21, 2017 10:00 AM CDT   RETURN OB with Alana Brito MD   [facility] ([facility])    [facility] [address]  [address]  [facility] [address] [phone]            Mar 21, 2017 11:00 AM CDT   [facility]   [facility] ([facility])    [facility] [address]  [address]  [facility] [address] [phone]            Mar 22, 2017  2:15 PM CDT   RETURN OB with Alana Beckford MD   [facility] ([facility])    [facility] [address]  [address]  [facility] [id number] [phone]            Mar 24, 2017 11:00 AM CDT   [facility]   [facility] ([facility])    [facility] [address]  [address]  [facility] [address] [phone]            Mar 28, 2017 11:00 AM CDT   [facility]   [facility] ([facility])    [facility] [address]  [address]  [facility] [address]   [phone]            Mar 28, 2017 11:30 AM CDT   RETURN OB with Rika Vu MD   [facility] ([facility])    [facility] [address]  [address]  [facility] [address]   [phone]            Mar 31, 2017 11:00 AM CDT   [email or web address]OUND   [facility] ([facility])    [facility] [address]  [address]  [facility] [address]   [phone]            Apr 04, 2017 11:00 AM CDT   UL[facility]ASOUND with [email or web address] ULTRASOUND II   [facility] ([facility])    [facility] [address]  [address]  [facility] [address] [phone]              Future tests that were ordered for you today     Open Standing Orders        Priority Remaining Interval Expires Ordered    BPP (Single) w/out NST (In Clinic) Routine 5/6  7/12/2017 3/14/2017    OB Doppler - UAR (Single)  (In Clinic) Routine 5/6  7/12/2017 3/14/2017            Who to contact     Please call your clinic at [phone] to:    Ask questions about your health    Make or cancel appointments    Discuss your medicines    Learn about your test results    Speak to your doctor   If you have compliments or concerns about an experience at your clinic, or if you wish to file a complaint, please contact [facility] Patient Relations at [phone] or email us at [email or web address]         Additional Information About Your Visit        Giveohart Information     Giveohart is an electronic gateway that provides easy, online access to your medical records. With Transphorm, you can request a clinic appointment, read your test results, renew a prescription or communicate with your care team.     To sign up for [employer] visit the website at [email or web address]   You will be asked to  "enter the access code listed below, as well as some personal information. Please follow the directions to create your username and password.     Your access code is: [email or web address]  Expires: 2017 10:00 AM     Your access code will  in 90 days. If you need help or a new code, please contact your [facility] or call [phone] for assistance.        Care EveryWhere ID     This is your Care EveryWhere ID. This could be used by other organizations to access your [place] medical records  [id number]        Your Vitals Were     Pulse Height Last Period Breastfeeding? BMI (Body Mass Index)       132 1.549 m (5' 1\") 2016 No 35.2 kg/m2        Blood Pressure from Last 3 Encounters:   03/15/17 114/75   17 132/74   17 118/76    Weight from Last 3 Encounters:   03/15/17 84.5 kg (186 lb 4.8 oz)   17 83.4 kg (183 lb 14.4 oz)   17 84.4 kg (186 lb)              Today, you had the following     No orders found for display       Primary Care Provider Office Phone # Fax #    Lilly Cartwright [phone]       [place] [address]        Thank you!     Thank you for choosing [facility]  for your care. Our goal is always to provide you with excellent care. Hearing back from our patients is one way we can continue to improve our services. Please take a few minutes to complete the written survey that you may receive in the mail after your visit with us. Thank you!             Your Updated Medication List - Protect others around you: Learn how to safely use, store and throw away your medicines at www.disposemymeds.org.          This list is accurate as of: 3/15/17  2:27 PM.  Always use your most recent med list.                   Brand Name Dispense Instructions for use    prenatal multivitamin  plus iron 27-0.8 MG Tabs per tablet     30 tablet    Take 1 tablet by mouth daily       vitamin D  " UNITS Caps     90 capsule    Take 1 capsule by mouth daily Take one capsule daily.

## 2017-03-15 NOTE — PROGRESS NOTES
"Doing well. Good movement, no ctx, lof, vb. Denies headache.   O: /75 (BP Location: Left arm, Patient Position: Chair, Cuff Size: Adult Large)  Pulse 132  Ht 1.549 m (5' 1\")  Wt 84.5 kg (186 lb 4.8 oz)  LMP 2016  Breastfeeding? No  BMI 35.2 kg/m2  A/P: 41 year old  @37w5d HRP  Asymmetric IUGR: Previously with borderline oligo, now normal. Declines IOL, BPP yesterday normal. Is doing 2x/week testing. Reviewed kick counting. Discussed IOL if fluid drops.   H/o preeclampsia: Normal BP, no symptoms. Continue to monitor.   RTC 1 week  Beryl Wan MD    "

## 2017-03-15 NOTE — NURSING NOTE
Chief Complaint   Patient presents with     Prenatal Care     37w5d       See CITLALY Dias 3/15/2017

## 2017-03-17 ENCOUNTER — OFFICE VISIT (OUTPATIENT)
Dept: OBGYN | Facility: CLINIC | Age: 42
End: 2017-03-17
Attending: OBSTETRICS & GYNECOLOGY
Payer: COMMERCIAL

## 2017-03-17 DIAGNOSIS — O09.523 AMA (ADVANCED MATERNAL AGE) MULTIGRAVIDA 35+, THIRD TRIMESTER: ICD-10-CM

## 2017-03-17 DIAGNOSIS — O36.5990 ASYMMETRIC IUGR AFFECTING PREGNANCY, ANTEPARTUM: ICD-10-CM

## 2017-03-17 PROCEDURE — 76819 FETAL BIOPHYS PROFIL W/O NST: CPT | Mod: ZF

## 2017-03-17 NOTE — PROGRESS NOTES
41 year old,  , presents at 38 0/7 weeks in pregnancy complicated by asymmetric IUGR, AMA  for biophysical assessment.    Fetal Breathing Movements (FBM): Normal - 2  Gross Body Movements (GBM): Normal - 2  Fetal Tone (FT): Normal - 2  AFV: Pocket of amniotic fluid > or = to 2 cm x 2 cm - 2  Quantitative Amniotic Fluid Volume Total: 11.93 cm      BPP 8/8.  FHR = 158bpm Normal.   MCA Not done.  NST Not done.     Single fetus in cephalic presentation.  Placenta anterior and grade 1.    Normal LIBERTY today.  Recommend biweekly assessment.    ORIANA Rodney MD MPH

## 2017-03-17 NOTE — MR AVS SNAPSHOT
After Visit Summary   3/17/2017    Tricia Tamez    MRN: [id number]           Patient Information     Date Of Birth          1975        Visit Information        Provider Department      3/17/2017 10:45 AM [facility] LANGUAGE SERVICES; [facility] ULTRASOUND [facility]        Today's Diagnoses     AMA (advanced maternal age) multigravida 35+, third trimester        Asymmetric IUGR affecting pregnancy, antepartum           Follow-ups after your visit        Your next 10 appointments already scheduled     Mar 21, 2017 10:00 AM CDT   RETURN OB with Alana Brito MD, Roslyn Corona   [facility] ([facility])    [place] [facility] [address]  [address]  [facility] [address] [phone]            Mar 21, 2017 11:00 AM CDT   ULTRASOUND with Roslyn Corona, [facility] ULTRASOUND   [facility] ([facility])    [place] [facility] [address]  [address]  [facility] [address] [phone]            Mar 22, 2017  2:15 PM CDT   RETURN OB with Alana Beckford MD   [facility] ([facility])    [place] [facility] [address]  [address]  [facility] [address] [phone]            Mar 24, 2017 11:00 AM CDT   ULTRASOUND with [facility] ULTRASOUND   [facility] ([facility])    [place] [facility] [address]  [address]  [facility] [address] [phone]            Mar 28, 2017 11:00 AM CDT   ULTRASOUND with [facility] ULTRASOUND   [facility] ([facility])    [place] [facility] [address]  [address]  [facility] [address] [phone]            Mar 28, 2017 11:30 AM CDT   RETURN OB with Rika Vu MD   [facility] ([facility])    [place] [facility] [address]  [address]  [address]  [facility] [address]   [phone]            Mar 31, 2017 11:00 AM CDT   ULTRASOUND with [facility] ULTRASOUND   [facility] ([facility])    [place] [facility] [address]  [address]  [facility] [address]   [phone]            2017 11:00 AM CDT   ULTRASOUND with [facility] ULTRASOUND II   [facility] ([facility])    [place] [facility] [address]  [address]  [facility] [address] [phone]              Who to contact     Please call your clinic at [phone] to:    Ask questions about your health    Make or cancel appointments    Discuss your medicines    Learn about your test results    Speak to your doctor   If you have compliments or concerns about an experience at your clinic, or if you wish to file a complaint, please contact [facility] Patient Relations at [phone] or email us at [email or web address]         Additional Information About Your Visit        SignalDemandharThrill On Information     [employer] is an electronic gateway that provides easy, online access to your medical records. With CoverHound, you can request a clinic appointment, read your test results, renew a prescription or communicate with your care team.     To sign up for [employer] visit the website at [email or web address]   You will be asked to enter the access code listed below, as well as some personal information. Please follow the directions to create your username and password.     Your access code is: [email or web address]  Expires: 2017 10:00 AM     Your access code will  in 90 days. If you need help or a new code, please contact your [facility] or call [phone] for assistance.        Care EveryWhere ID     This is your Care EveryWhere ID. This could be used by other organizations to access your [facility]  records  [id number]        Your Vitals Were     Last Period                   06/24/2016            Blood Pressure from Last 3 Encounters:   03/15/17 114/75   03/07/17 132/74   03/01/17 118/76    Weight from Last 3 Encounters:   03/15/17 84.5 kg (186 lb 4.8 oz)   03/07/17 83.4 kg (183 lb 14.4 oz)   03/01/17 84.4 kg (186 lb)              We Performed the Following     BPP (Single) w/out NST (In Clinic)        Primary Care Provider Office Phone # Fax Malik Cartwright [phone]       [facility] [address]  [facility] [address]        Thank you!     Thank you for choosing [facility]  for your care. Our goal is always to provide you with excellent care. Hearing back from our patients is one way we can continue to improve our services. Please take a few minutes to complete the written survey that you may receive in the mail after your visit with us. Thank you!             Your Updated Medication List - Protect others around you: Learn how to safely use, store and throw away your medicines at www.disposemymeds.org.          This list is accurate as of: 3/17/17  1:51 PM.  Always use your most recent med list.                   Brand Name Dispense Instructions for use    prenatal multivitamin  plus iron 27-0.8 MG Tabs per tablet     30 tablet    Take 1 tablet by mouth daily       vitamin D 2000 UNITS Caps     90 capsule    Take 1 capsule by mouth daily Take one capsule daily.

## 2017-03-17 NOTE — LETTER
3/17/2017       RE: Tricia Tamez  [address]  [facility] [address]     Dear Colleague,    Thank you for referring your patient, Tricia Tamez, to the [facility] at [facility]. Please see a copy of my visit note below.    41 year old,  , presents at 38 0/7 weeks in pregnancy complicated by asymmetric IUGR, AMA  for biophysical assessment.    Fetal Breathing Movements (FBM): Normal - 2  Gross Body Movements (GBM): Normal - 2  Fetal Tone (FT): Normal - 2  AFV: Pocket of amniotic fluid > or = to 2 cm x 2 cm - 2  Quantitative Amniotic Fluid Volume Total: 11.93 cm      BPP 8/8.  FHR = 158bpm Normal.   MCA Not done.  NST Not done.     Single fetus in cephalic presentation.  Placenta anterior and grade 1.    Normal LIBERTY today.  Recommend biweekly assessment.    ORIANA Rodney MD MPH      [facility] LANGUAGE SERVICES

## 2017-03-21 ENCOUNTER — OFFICE VISIT (OUTPATIENT)
Dept: OBGYN | Facility: CLINIC | Age: 42
End: 2017-03-21
Attending: OBSTETRICS & GYNECOLOGY
Payer: COMMERCIAL

## 2017-03-21 VITALS
DIASTOLIC BLOOD PRESSURE: 80 MMHG | BODY MASS INDEX: 35.11 KG/M2 | SYSTOLIC BLOOD PRESSURE: 130 MMHG | WEIGHT: 185.8 LBS | HEART RATE: 120 BPM

## 2017-03-21 DIAGNOSIS — O36.5990 ASYMMETRIC IUGR AFFECTING PREGNANCY, ANTEPARTUM: ICD-10-CM

## 2017-03-21 DIAGNOSIS — O09.523 AMA (ADVANCED MATERNAL AGE) MULTIGRAVIDA 35+, THIRD TRIMESTER: ICD-10-CM

## 2017-03-21 DIAGNOSIS — O09.523 ELDERLY MULTIGRAVIDA IN THIRD TRIMESTER: ICD-10-CM

## 2017-03-21 DIAGNOSIS — O09.93 HIGH-RISK PREGNANCY, THIRD TRIMESTER: Primary | ICD-10-CM

## 2017-03-21 PROCEDURE — 99212 OFFICE O/P EST SF 10 MIN: CPT | Mod: ZF

## 2017-03-21 PROCEDURE — 76820 UMBILICAL ARTERY ECHO: CPT | Mod: ZF

## 2017-03-21 PROCEDURE — 76819 FETAL BIOPHYS PROFIL W/O NST: CPT | Mod: ZF

## 2017-03-21 PROCEDURE — T1013 SIGN LANG/ORAL INTERPRETER: HCPCS | Mod: U3,ZF

## 2017-03-21 ASSESSMENT — PAIN SCALES - GENERAL: PAINLEVEL: NO PAIN (0)

## 2017-03-21 NOTE — PROGRESS NOTES
Subjective:     41 year old  at 38w4d presents for routine prenatal visit.            no vaginal bleeding or leakage of fluid.  no contractions.  good fetal movement.        No HA, visual changes, RUQ or epigastric pain.   Patient concerns: continues to decline induction for asymmetric IUGR and history of oligohydramnios (most recent LIBERTY normal)  Feeling well overall.  Declines cervical exam today.  Objective:  Vitals:    17 1025   BP: 130/80   Pulse: 120   Weight: 84.3 kg (185 lb 12.8 oz)    See OB flowsheet  Assessment/Plan     Encounter Diagnoses   Name Primary?     High-risk pregnancy, third trimester Yes     Asymmetric IUGR affecting pregnancy, antepartum      Elderly multigravida in third trimester      No orders of the defined types were placed in this encounter.    No orders of the defined types were placed in this encounter.    - Reviewed continued twice weekly testing and induction of labor at 39 weeks, she declines this now.   - Reviewed why/how to contact provider if headache/visual changes/RUQ or epigastric pain, decreased fetal movement, vaginal bleeding, leakage of fluid or strong/regular contractions.  BPP today and later this week    Return to clinic in 1 week and prn if questions or concerns.   Alana Brito MD

## 2017-03-21 NOTE — PROGRESS NOTES
CC/HPI:   Tricia Tamez is a 41 year old female  who presents today for her annual exam. She is currently using {CONTRACEPTIVE:5051}  and would like to continue with this method of birth control.    HISTORIES:  Patient Active Problem List   Diagnosis     High-risk pregnancy     Pregnancy with abnormal glucose tolerance test (GTT)     History of pre-eclampsia     Supervision of pregnancy with insufficient  care     Extreme poverty     No past medical history on file.  No past surgical history on file.  Current Outpatient Prescriptions   Medication Sig Dispense Refill     Cholecalciferol (VITAMIN D) 2000 UNITS CAPS Take 1 capsule by mouth daily Take one capsule daily. 90 capsule 3     Prenatal Vit-Fe Fumarate-FA (PRENATAL MULTIVITAMIN  PLUS IRON) 27-0.8 MG TABS Take 1 tablet by mouth daily 30 tablet 3     No Known Allergies  Social History     Social History     Marital status:      Spouse name: N/A     Number of children: N/A     Years of education: N/A     Occupational History     Not on file.     Social History Main Topics     Smoking status: Never Smoker     Smokeless tobacco: Never Used     Alcohol use No     Drug use: No     Sexual activity: Yes     Partners: Male     Other Topics Concern     Not on file     Social History Narrative    How much exercise per week? none    How much calcium per day? unknown       How much caffeine per day? none    How much vitamin D per day? supplement    Do you/your family wear seatbelts?  Yes    Do you/your family use safety helmets? Yes    Do you/your family use sunscreen? No    Do you/your family keep firearms in the home? No    Do you/your family have a smoke detector(s)? Yes        Do you feel safe in your home? Yes    Has anyone ever touched you in an unwanted manner? No     Explain     Updated  BILLY Mccullough          No family history on file.       Gyn Hx:   Patient's last menstrual period was 2016.  Menses:   {:263755}    Review Of Systems:  {ROS  "NORMAL:417875::\"C: NEGATIVE for fever, chills\",\"E: NEGATIVE for vision changes \",\"R: NEGATIVE for significant cough or SOB\",\"CV: NEGATIVE for chest pain, palpitations \",\"GI: NEGATIVE for nausea, abdominal pain, heartburn, or change in bowel habits\",\": NEGATIVE for frequency, dysuria, or hematuria\",\"M: NEGATIVE for significant arthralgias or myalgia\",\"N: NEGATIVE for weakness, dizziness or paresthesias or headache\"}    EXAM:  /80  Pulse 120  Wt 84.3 kg (185 lb 12.8 oz)  LMP 06/24/2016  Breastfeeding? No  BMI 35.11 kg/m2  Body mass index is 35.11 kg/(m^2).    General - pleasant female in no acute distress.  Skin - no suspicious lesions or rashes  EENT-  PERRLA, euthyroid with out palpable nodules  Neck - supple without lymphadenopathy.  Lungs - clear to auscultation bilaterally.  Heart - regular rate and rhythm without murmur.  Breasts- symmetrical . No dominant fixed or suspicious masses noted.  No skin or nipple changes or axillary nodes. Self exam is taught and encouraged monthly.  Abdomen - soft, nontender, nondistended, no masses or organomegaly noted.  Musculoskeletal - no gross deformities.  Neurological - normal strength, sensation, and mental status.  Pelvic - EG: normal  female, vulva reveals no erythema or lesions.   BUS: within normal limits.  Vagina: well rugated, no lesions polyps or suspicious  discharge.     Cervix: no lesions, polyps discharge or CMT.  Uterus: firm, *** anteverted, normal size and nontender.  Adnexa: no masses or tenderness.  Anus- normal, no lesions.  Rectovaginal - deferred.    ASSESSMENT/PLAN  No diagnosis found.    Additional teaching done at this visit regarding {TEACHING DONE:772117}. I have discussed with patient the harms and  benefits of *** medications, treatment options.     The patient will be notified of any results via {Contact Preference:[id number]}.  "

## 2017-03-21 NOTE — MR AVS SNAPSHOT
After Visit Summary   3/21/2017    Tricia Tamez    MRN: [id number]           Patient Information     Date Of Birth          1975        Visit Information        Provider Department      3/21/2017 10:00 AM Roslyn Corona; Alana Brito MD [facility]        Today's Diagnoses     High-risk pregnancy, third trimester    -  1    Asymmetric IUGR affecting pregnancy, antepartum        Elderly multigravida in third trimester           Follow-ups after your visit        Follow-up notes from your care team     Return in about 1 week (around 3/28/2017).      Your next 10 appointments already scheduled     Mar 22, 2017  2:00 PM CDT   RETURN OB with Alana Beckford MD, [facility]   [facility] ([facility])    [place] [facility] [address]  [facility] [address]   [phone]            Mar 24, 2017 11:00 AM CDT   ULTRASOUND with [facility] ULTRASOUND   [facility] ([facility])    [place] [facility] [address]  [address]  [facility] [address]   [phone]            Mar 28, 2017 11:00 AM CDT   ULTRASOUND with [facility] [place] ULTRASOUND   [facility] ([facility])    [place] [facility] [address]  [address]  [facility] [address]   [phone]            Mar 28, 2017 11:30 AM CDT   RETURN OB with Rika Vu MD   [facility] ([facility])    [place] [facility] [address]  [address]  [facility] [address]   [phone]            Mar 31, 2017 11:00 AM CDT   ULTRASOUND with [facility] [place] ULTRASOUND   [facility] ([facility])    [place] [facility] [address]  [address]  [facility] [address]   [phone]            Apr 04, 2017 11:00 AM CDT   ULTRASOUND with [facility] [place]  ULTRASOUND II   [facility] ([facility])    [place] [facility] [address]  [address]  [facility] [address]   [phone]            2017 11:00 AM CDT   [facility]   [facility] ([facility])    [place] [facility] [address]  [address]  [facility] [address]   [phone]            2017  1:15 PM CDT   RETURN OB with Marisa Shabazz MD   [facility] ([facility])    [place] [facility] [address]  [address]  [facility] [address] [phone]              Who to contact     Please call your clinic at [phone] to:    Ask questions about your health    Make or cancel appointments    Discuss your medicines    Learn about your test results    Speak to your doctor   If you have compliments or concerns about an experience at your clinic, or if you wish to file a complaint, please contact [facility] Patient Relations at [phone] or email us at [email or web address]         Additional Information About Your Visit        Digital Authentication Technologies[employer] Information     Sef[employer] is an electronic gateway that provides easy, online access to your medical records. With DTT, you can request a clinic appointment, read your test results, renew a prescription or communicate with your care team.     To sign up for DTT visit the website at [email or web address]   You will be asked to enter the access code listed below, as well as some personal information. Please follow the directions to create your username and password.     Your access code is: [email or web address]  Expires: 2017 10:00 AM     Your access code will  in 90 days. If you need help or a new code, please contact your [facility] or call [phone] for assistance.        Care EveryWhere ID      This is your Care EveryWhere ID. This could be used by other organizations to access your [place] medical records  [id number]        Your Vitals Were     Pulse Last Period Breastfeeding? BMI (Body Mass Index)          120 06/24/2016 No 35.11 kg/m2         Blood Pressure from Last 3 Encounters:   03/21/17 130/80   03/15/17 114/75   03/07/17 132/74    Weight from Last 3 Encounters:   03/21/17 84.3 kg (185 lb 12.8 oz)   03/15/17 84.5 kg (186 lb 4.8 oz)   03/07/17 83.4 kg (183 lb 14.4 oz)              Today, you had the following     No orders found for display       Primary Care Provider Office Phone # Fax #    Lilly Cartwright [phone]       [place] [address] [facility] [address]        Thank you!     Thank you for choosing [facility]  for your care. Our goal is always to provide you with excellent care. Hearing back from our patients is one way we can continue to improve our services. Please take a few minutes to complete the written survey that you may receive in the mail after your visit with us. Thank you!             Your Updated Medication List - Protect others around you: Learn how to safely use, store and throw away your medicines at www.disposemymeds.org.          This list is accurate as of: 3/21/17  4:36 PM.  Always use your most recent med list.                   Brand Name Dispense Instructions for use    prenatal multivitamin  plus iron 27-0.8 MG Tabs per tablet     30 tablet    Take 1 tablet by mouth daily       vitamin D 2000 UNITS Caps     90 capsule    Take 1 capsule by mouth daily Take one capsule daily.

## 2017-03-21 NOTE — LETTER
3/21/2017       RE: Tricia Tamez  [address]  [place] [address]     Dear Colleague,    Thank you for referring your patient, Tricia Tamez, to the [facility] at [facility]. Please see a copy of my visit note below.    41 year old,  , presents at 38 4/7 weeks in pregnancy complicated by asymmetric IUGR, AMA  for biophysical assessment.    Fetal Breathing Movements (FBM): Normal - 2  Gross Body Movements (GBM): Normal - 2  Fetal Tone (FT): Normal - 2  AFV: Pocket of amniotic fluid > or = to 2 cm x 2 cm - 2  Quantitative Amniotic Fluid Volume Total: 10.7 cm    BPP 8/8.  FHR= 175bpm Normal.     S/D=1.89= normal    MCA Not done.  NST Not done.     Single fetus in cephalic presentation.  Placenta anterior and grade 1.    Recommend twice weekly BPP with UAR assessment.  q3-4 week growth assessments.  Recommend IOL at 39 weeks gestation.    ORIANA Rodney MD    Again, thank you for allowing me to participate in the care of your patient.      Sincerely,    Roslyn Corona

## 2017-03-21 NOTE — NURSING NOTE
Chief Complaint   Patient presents with     Prenatal Care     ROBERTO 38 weeks and 4 days   Shanna Weaver LPN

## 2017-03-21 NOTE — LETTER
3/21/2017       RE: Tricia Tamez  [address]  [place] [facility] [facility] [address]     Dear Colleague,    Thank you for referring your patient, Tricia Tamez, to the [facility] at [facility]. Please see a copy of my visit note below.    Subjective:     41 year old  at 38w4d presents for routine prenatal visit.            no vaginal bleeding or leakage of fluid.  no contractions.  good fetal movement.        No HA, visual changes, RUQ or epigastric pain.   Patient concerns: continues to decline induction for asymmetric IUGR and history of oligohydramnios (most recent LIBERTY normal)  Feeling well overall.  Declines cervical exam today.  Objective:  Vitals:    17 1025   BP: 130/80   Pulse: 120   Weight: 84.3 kg (185 lb 12.8 oz)    See OB flowsheet  Assessment/Plan     Encounter Diagnoses   Name Primary?     High-risk pregnancy, third trimester Yes     Asymmetric IUGR affecting pregnancy, antepartum      Elderly multigravida in third trimester      No orders of the defined types were placed in this encounter.    No orders of the defined types were placed in this encounter.    - Reviewed continued twice weekly testing and induction of labor at 39 weeks, she declines this now.   - Reviewed why/how to contact provider if headache/visual changes/RUQ or epigastric pain, decreased fetal movement, vaginal bleeding, leakage of fluid or strong/regular contractions.  BPP today and later this week    Return to clinic in 1 week and prn if questions or concerns.   Alana Brito MD

## 2017-03-21 NOTE — PROGRESS NOTES
41 year old,  , presents at 38 4/7 weeks in pregnancy complicated by asymmetric IUGR, AMA  for biophysical assessment.    Fetal Breathing Movements (FBM): Normal - 2  Gross Body Movements (GBM): Normal - 2  Fetal Tone (FT): Normal - 2  AFV: Pocket of amniotic fluid > or = to 2 cm x 2 cm - 2  Quantitative Amniotic Fluid Volume Total: 10.7 cm    BPP 8/8.  FHR= 175bpm Normal.     S/D=1.89= normal    MCA Not done.  NST Not done.     Single fetus in cephalic presentation.  Placenta anterior and grade 1.    Recommend twice weekly BPP with UAR assessment.  q3-4 week growth assessments.  Recommend IOL at 39 weeks gestation.    ORIANA Rodney MD

## 2017-03-21 NOTE — MR AVS SNAPSHOT
After Visit Summary   3/21/2017    Tricia Tamez    MRN: [id number]           Patient Information     Date Of Birth          1975        Visit Information        Provider Department      3/21/2017 11:00 AM Roslyn Corona; [facility] ULTRASOUND  Services Department        Today's Diagnoses     AMA (advanced maternal age) multigravida 35+, third trimester        Asymmetric IUGR affecting pregnancy, antepartum           Follow-ups after your visit        Your next 10 appointments already scheduled     Mar 22, 2017  2:00 PM CDT   RETURN OB with Alana Beckford MD, [facility]   [facility] ([facility])    [place] [facility] [address]  [address]  [facility] [address]   [phone]            Mar 24, 2017 11:00 AM CDT   ULTRASOUND with [facility] ULTRASOUND   [facility] ([facility])    [place] [facility] [address]  [address]  [facility] [address] [phone]            Mar 28, 2017 11:00 AM CDT   ULTRASOUND with [facility] ULTRASOUND   [facility] ([facility])    [place] [facility] [address]  [address]  [facility] [address]   [phone]            Mar 28, 2017 11:30 AM CDT   RETURN OB with Rika Vu MD   [facility] ([facility])    [place] [facility] [address]  [address]  [facility] [address] [phone]            Mar 31, 2017 11:00 AM CDT   ULTRASOUND with [facility] ULTRASOUND   [facility] ([facility])    [place] [facility] [address]  [address]  [facility] [address]   [phone]            Apr 04, 2017 11:00 AM CDT   ULTRASOUND with [facility] ULTRASOUND II   [facility] ([facility])    [place] [facility] [address]  [facility] [address]   [phone]            2017 11:00 AM CDT   ULTRASOUND with [facility] ULTRASOUND   [facility] ([facility])    [place] [facility] [address]  [address]  [facility] [address]   [phone]            2017  1:15 PM CDT   RETURN OB with Marisa Shabazz MD   [facility] ([facility])    [place] [facility] [address]  [address]  [facility] [address]   [phone]              Who to contact     Please call your clinic at [phone] to:    Ask questions about your health    Make or cancel appointments    Discuss your medicines    Learn about your test results    Speak to your doctor   If you have compliments or concerns about an experience at your clinic, or if you wish to file a complaint, please contact [facility] Patient Relations at [phone] or email us at [email or web address]         Additional Information About Your Visit        TwoChopharEdgeCast Networks Information     CultureMapt is an electronic gateway that provides easy, online access to your medical records. With Genero, you can request a clinic appointment, read your test results, renew a prescription or communicate with your care team.     To sign up for CultureMapt visit the website at [email or web address]   You will be asked to enter the access code listed below, as well as some personal information. Please follow the directions to create your username and password.     Your access code is: [email or web address]  Expires: 2017 10:00 AM     Your access code will  in 90 days. If you need help or a new code, please contact your [facility] or call [phone] for assistance.        Care EveryWhere ID     This is your Care EveryWhere ID. This could be used by other organizations to access your [facility]  records  [id number]        Your Vitals Were     Last Period                   06/24/2016            Blood Pressure from Last 3 Encounters:   03/21/17 130/80   03/15/17 114/75   03/07/17 132/74    Weight from Last 3 Encounters:   03/21/17 84.3 kg (185 lb 12.8 oz)   03/15/17 84.5 kg (186 lb 4.8 oz)   03/07/17 83.4 kg (183 lb 14.4 oz)              We Performed the Following     BPP (Single) w/out NST (In Clinic)     OB Doppler - UAR (Single)  (In Clinic)        Primary Care Provider Office Phone # Fax #    Lilly Cartwright [phone]       [address] [address]        Thank you!     Thank you for choosing [facility]  for your care. Our goal is always to provide you with excellent care. Hearing back from our patients is one way we can continue to improve our services. Please take a few minutes to complete the written survey that you may receive in the mail after your visit with us. Thank you!             Your Updated Medication List - Protect others around you: Learn how to safely use, store and throw away your medicines at www.disposemymeds.org.          This list is accurate as of: 3/21/17  2:59 PM.  Always use your most recent med list.                   Brand Name Dispense Instructions for use    prenatal multivitamin  plus iron 27-0.8 MG Tabs per tablet     30 tablet    Take 1 tablet by mouth daily       vitamin D 2000 UNITS Caps     90 capsule    Take 1 capsule by mouth daily Take one capsule daily.

## 2017-03-24 ENCOUNTER — OFFICE VISIT (OUTPATIENT)
Dept: OBGYN | Facility: CLINIC | Age: 42
End: 2017-03-24
Attending: OBSTETRICS & GYNECOLOGY
Payer: COMMERCIAL

## 2017-03-24 DIAGNOSIS — O36.5990 ASYMMETRIC IUGR AFFECTING PREGNANCY, ANTEPARTUM: ICD-10-CM

## 2017-03-24 DIAGNOSIS — O09.523 AMA (ADVANCED MATERNAL AGE) MULTIGRAVIDA 35+, THIRD TRIMESTER: ICD-10-CM

## 2017-03-24 PROCEDURE — T1013 SIGN LANG/ORAL INTERPRETER: HCPCS | Mod: U3,ZF

## 2017-03-24 PROCEDURE — 76819 FETAL BIOPHYS PROFIL W/O NST: CPT | Mod: ZF

## 2017-03-24 PROCEDURE — 76820 UMBILICAL ARTERY ECHO: CPT | Mod: ZF

## 2017-03-24 NOTE — MR AVS SNAPSHOT
After Visit Summary   3/24/2017    Tricia Tamez    MRN: [id number]           Patient Information     Date Of Birth          1975        Visit Information        Provider Department      3/24/2017 10:45 AM [place] [employer] [facility]; [facility] ULTRASOUND [facility]        Today's Diagnoses     AMA (advanced maternal age) multigravida 35+, third trimester        Asymmetric IUGR affecting pregnancy, antepartum           Follow-ups after your visit        Your next 10 appointments already scheduled     Mar 28, 2017 10:45 AM CDT   [facility]   [facility] ([facility])    [place] [facility] [address]  [address]  [facility] [address] [phone]            Mar 28, 2017 11:15 AM CDT   RETURN OB with Rika Vu MD, Roslyn Corona   [facility] ([facility])    [place] [facility] [address]  [address]  [facility] [address] [phone]            Mar 31, 2017 11:00 AM CDT   ULTRASOUND with [facility] ULTRASOUND   [facility] ([facility])    [place] [facility] [address]  [address]  [facility] [address] [phone]            Apr 04, 2017 11:00 AM CDT   ULTRASOUND with [facility] ULTRASOUND II   [facility] ([facility])    [place] [facility] [address]  [address]  [facility] [address] [phone]            Apr 07, 2017 11:00 AM CDT   ULTRASOUND with [facility] ULTRASOUND   [facility] ([facility])    [place] [facility] [address]  [address]  [facility] [address] [phone]            Apr 07, 2017  1:15 PM CDT   RETURN OB with Marisa Shabazz MD   [facility] ([facility])    [place] [facility]  [address]  [address]  [facility] [address] [phone]              Who to contact     Please call your clinic at [phone] to:    Ask questions about your health    Make or cancel appointments    Discuss your medicines    Learn about your test results    Speak to your doctor   If you have compliments or concerns about an experience at your clinic, or if you wish to file a complaint, please contact [facility] Patient Relations at [phone] or email us at [email or web address]         Additional Information About Your Visit        Elcelyx [employer]rapeutics Information     El[employer] is an electronic gateway that provides easy, online access to your medical records. With Elc[employer], you can request a clinic appointment, read your test results, renew a prescription or communicate with your care team.     To sign up for Elc[employer] visit the website at [email or web address]   You will be asked to enter the access code listed below, as well as some personal information. Please follow the directions to create your username and password.     Your access code is: [email or web address]  Expires: 2017 10:00 AM     Your access code will  in 90 days. If you need help or a new code, please contact your [facility] or call [phone] for assistance.        Care EveryWhere ID     This is your Care EveryWhere ID. This could be used by other organizations to access your [place] medical records  [id number]        Your Vitals Were     Last Period                   2016            Blood Pressure from Last 3 Encounters:   17 130/80   03/15/17 114/75   17 132/74    Weight from Last 3 Encounters:   17 84.3 kg (185 lb 12.8 oz)   03/15/17 84.5 kg (186 lb 4.8 oz)   17 83.4 kg (183 lb 14.4 oz)              We Performed the Following     BPP (Single) w/out NST (In Clinic)     OB Doppler - UAR (Single)  (In Clinic)        Primary Care  Provider Office Phone # Fax #    Lilly Cartwright [phone]       [facility]  [address] [facility] [address]        Thank you!     Thank you for choosing [facility]  for your care. Our goal is always to provide you with excellent care. Hearing back from our patients is one way we can continue to improve our services. Please take a few minutes to complete the written survey that you may receive in the mail after your visit with us. Thank you!             Your Updated Medication List - Protect others around you: Learn how to safely use, store and throw away your medicines at www.disposemymeds.org.          This list is accurate as of: 3/24/17 11:59 PM.  Always use your most recent med list.                   Brand Name Dispense Instructions for use    prenatal multivitamin  plus iron 27-0.8 MG Tabs per tablet     30 tablet    Take 1 tablet by mouth daily       vitamin D 2000 UNITS Caps     90 capsule    Take 1 capsule by mouth daily Take one capsule daily.

## 2017-03-24 NOTE — PROGRESS NOTES
41 year old,  , presents at 39 0/7 weeks in pregnancy complicated by asymmetric IUGR, AMA  for biophysical assessment.    Fetal Breathing Movements (FBM): Normal - 2  Gross Body Movements (GBM): Normal - 2  Fetal Tone (FT): Normal - 2  AFV: Pocket of amniotic fluid > or = to 2 cm x 2 cm - 2  Quantitative Amniotic Fluid Volume Total: 9.9 cm      BPP 8/8.  FHR= 169bpm Normal.    S/D= 1.88= WNL   MCA Not done.  NST Not done.     Single fetus in cephalic presentation.  Placenta anterior and grade 1.    Recommend biweekly BPP with UAR assessment.    ORIANA Rodney MD MPH

## 2017-03-24 NOTE — LETTER
3/24/2017       RE: Tricia Tamez  [address]  [place] [address]     Dear Colleague,    Thank you for referring your patient, Tricia Tamez, to the [facility] at [facility] Please see a copy of my visit note below.    41 year old,  , presents at 39 0/7 weeks in pregnancy complicated by asymmetric IUGR, AMA  for biophysical assessment.    Fetal Breathing Movements (FBM): Normal - 2  Gross Body Movements (GBM): Normal - 2  Fetal Tone (FT): Normal - 2  AFV: Pocket of amniotic fluid > or = to 2 cm x 2 cm - 2  Quantitative Amniotic Fluid Volume Total: 9.9 cm      BPP .  FHR= 169bpm Normal.    S/D= 1.88= WNL   MCA Not done.  NST Not done.     Single fetus in cephalic presentation.  Placenta anterior and grade 1.    Recommend biweekly BPP with UAR assessment.    ORIANA Rodney MD MPH    [place] [facility]

## 2017-03-28 ENCOUNTER — OFFICE VISIT (OUTPATIENT)
Dept: OBGYN | Facility: CLINIC | Age: 42
End: 2017-03-28
Attending: OBSTETRICS & GYNECOLOGY
Payer: COMMERCIAL

## 2017-03-28 VITALS
DIASTOLIC BLOOD PRESSURE: 71 MMHG | HEART RATE: 91 BPM | WEIGHT: 186.7 LBS | SYSTOLIC BLOOD PRESSURE: 114 MMHG | BODY MASS INDEX: 35.25 KG/M2 | HEIGHT: 61 IN

## 2017-03-28 DIAGNOSIS — O36.5990 ASYMMETRIC IUGR AFFECTING PREGNANCY, ANTEPARTUM: ICD-10-CM

## 2017-03-28 DIAGNOSIS — O09.523 AMA (ADVANCED MATERNAL AGE) MULTIGRAVIDA 35+, THIRD TRIMESTER: ICD-10-CM

## 2017-03-28 DIAGNOSIS — O09.523 AMA (ADVANCED MATERNAL AGE) MULTIGRAVIDA 35+, THIRD TRIMESTER: Primary | ICD-10-CM

## 2017-03-28 DIAGNOSIS — O09.93 HIGH-RISK PREGNANCY, THIRD TRIMESTER: ICD-10-CM

## 2017-03-28 PROCEDURE — 99212 OFFICE O/P EST SF 10 MIN: CPT | Mod: 25

## 2017-03-28 PROCEDURE — 76819 FETAL BIOPHYS PROFIL W/O NST: CPT | Mod: ZF

## 2017-03-28 PROCEDURE — 76820 UMBILICAL ARTERY ECHO: CPT | Mod: ZF

## 2017-03-28 PROCEDURE — T1013 SIGN LANG/ORAL INTERPRETER: HCPCS | Mod: U3,ZF

## 2017-03-28 PROCEDURE — T1013 SIGN LANG/ORAL INTERPRETER: HCPCS | Mod: U3,ZF | Performed by: OBSTETRICS & GYNECOLOGY

## 2017-03-28 NOTE — LETTER
3/28/2017       RE: Tricia Tamez  [address]  [place] [address]     Dear Colleague,    Thank you for referring your patient, Tricia Tamez, to the [facility] at [facility]. Please see a copy of my visit note below.    41 year old,  , presents at 39 4/7 weeks in pregnancy complicated by asymmetric IUGR, AMA   for biophysical assessment.    Fetal Breathing Movements (FBM): Normal - 2  Gross Body Movements (GBM): Normal - 2  Fetal Tone (FT): Normal - 2  AFV: Pocket of amniotic fluid > or = to 2 cm x 2 cm - 2  Quantitative Amniotic Fluid Volume Total: 15.9 cm      BPP 8/8.  FHR = 149BPM Normal.     S/D 1.97= WNL  MCA Not done.  NST Not done.     Single fetus in cephalic presentation.  Placenta anterior and grade 1.    IOL was recommended again today and patient declined.  Recommend continued 2x/ week BPP w/ dopplers assessments until delivery.    ORIANA Rodney MD, FACOG  [facility]  OB/GYN    3/28/2017  2:29 PM          Again, thank you for allowing me to participate in the care of your patient.      Sincerely,    Roslyn Corona

## 2017-03-28 NOTE — LETTER
"3/28/2017       RE: Tricia Tamez  [address]  [facility] [address]     Dear Colleague,    Thank you for referring your patient, Tricia Tamez, to the [facility] at [facility]. Please see a copy of my visit note below.    S:  Tricia Tamez is a 40yo  at 39w4d presents for ROBERTO, pregnancy complicated by asymmetric IUGR and history of Oligohydramnios (most recent LIBERTY normal). Patient reports +FM, denies VB, CTX, LOF. Patient declines IOL at this time, states other pregnancies have been 40+ weeks. Reports understanding of dx of IUGR but desires to wait for spontaneous labor.  No HA, visual changes, edema, RUQ or epigastric pain. Continues bi-weekly BPP monitoring - Last today 3/28  -Declines cervical exam today.  -Patient has a history of latent TB treated for 2 months per chart review, should address this after delivery    O  /71  Pulse 91  Ht 1.549 m (5' 1\")  Wt 84.7 kg (186 lb 11.2 oz)  LMP 2016  BMI 35.28 kg/m2  Gen: Alert, pleasant, cooperative, no acute distress  CV: RRR  Lungs: Clear   Abdomen: Soft, non-tender, Gravid at 39cm  Extremities: No peripheral edema    A/P: Tricia Tamez is a 40yo  at 39w4d presents for ROBERTO    #PNC:  -Continue biweekly BPP to monitor fetal growth and LIBERTY - reassuring today  -Discussed options for IOL with patient, she was not interested in this at this time.  -Discussed signs and symptoms of Pre-E with patient and to contact provider if headache/visual changes/RUQ or epigastric pain, decreased fetal movement, vaginal bleeding, leakage of fluid or strong/regular contractions.    #Latent TB   -This should be addressed at post partum visit with TST or chest xray    RTC or L&D in 1 week or when in active labor    I, Melina Rebolledo MS3, acted as scribe for Rika Vaz MD during this visit. All aspects of the exam and documentation were approved by the attending physician.    I appreciate the note above by " Melina Rebolledo, MS3. I evaluated and examined the patient with the medical student who acted as scribe for this visit. I agree with all history, exam, findings, assessment and plan as documented. Any necessary changes have been made by me.    Rika Vaz MD MPH

## 2017-03-28 NOTE — MR AVS SNAPSHOT
After Visit Summary   3/28/2017    Tricia Tamez    MRN: [id number]           Patient Information     Date Of Birth          1975        Visit Information        Provider Department      3/28/2017 11:15 AM Roslyn Corona; Rika Britton MD [facility]        Today's Diagnoses     AMA (advanced maternal age) multigravida 35+, third trimester    -  1    Asymmetric IUGR affecting pregnancy, antepartum        High-risk pregnancy, third trimester           Follow-ups after your visit        Your next 10 appointments already scheduled     Mar 31, 2017 10:45 AM CDT   [facility] [place] [facility]   [facility] ([facility])    [place] [facility]  [facility] [address] [phone]            Apr 04, 2017 11:00 AM CDT   ULTRASOUND with [facility] ULTRASOUND II   [facility] ([facility])    [place] [facility] [address]  [address]  [facility] [id number] [phone]            Apr 07, 2017 11:00 AM CDT   ULTRASOUND with [facility] ULTRASOUND   [facility] ([facility])    [place] [facility] [address]  [address]  [facility] [address] [phone]            Apr 07, 2017  1:15 PM CDT   RETURN OB with Marisa Shabazz MD   [facility] ([facility])    [place] [facility] [address]  [address]  [facility] [address] [phone]              Who to contact     Please call your clinic at [phone] to:    Ask questions about your health    Make or cancel appointments    Discuss your medicines    Learn about your test results    Speak to your doctor   If you have compliments or concerns about an experience at your clinic, or if you wish to file a complaint, please contact [facility]  "[place] [facility] Patient Relations at [phone] or email us at [email or web address]         Additional Information About Your Visit        Semba Bioscienceshart Information     GoMoto is an electronic gateway that provides easy, online access to your medical records. With GoMoto, you can request a clinic appointment, read your test results, renew a prescription or communicate with your care team.     To sign up for GoMoto visit the website at [email or web address]   You will be asked to enter the access code listed below, as well as some personal information. Please follow the directions to create your username and password.     Your access code is: [email or web address]  Expires: 2017 10:00 AM     Your access code will  in 90 days. If you need help or a new code, please contact your [facility] or call [phone] for assistance.        Care EveryWhere ID     This is your Care EveryWhere ID. This could be used by other organizations to access your [place] medical records  [id number]        Your Vitals Were     Pulse Height Last Period BMI (Body Mass Index)          91 1.549 m (5' 1\") 2016 35.28 kg/m2         Blood Pressure from Last 3 Encounters:   17 114/71   17 130/80   03/15/17 114/75    Weight from Last 3 Encounters:   17 84.7 kg (186 lb 11.2 oz)   17 84.3 kg (185 lb 12.8 oz)   03/15/17 84.5 kg (186 lb 4.8 oz)              Today, you had the following     No orders found for display       Primary Care Provider Office Phone # Fax #    Lilly Cartwright [phone]       [facility] [address]  [facility] [address]        Thank you!     Thank you for choosing [facility]  for your care. Our goal is always to provide you with excellent care. Hearing back from our patients is one way we can continue to improve our services. Please take a few minutes to complete the written survey that you " may receive in the mail after your visit with us. Thank you!             Your Updated Medication List - Protect others around you: Learn how to safely use, store and throw away your medicines at www.disposemymeds.org.          This list is accurate as of: 3/28/17 11:59 PM.  Always use your most recent med list.                   Brand Name Dispense Instructions for use    prenatal multivitamin  plus iron 27-0.8 MG Tabs per tablet     30 tablet    Take 1 tablet by mouth daily       vitamin D 2000 UNITS Caps     90 capsule    Take 1 capsule by mouth daily Take one capsule daily.

## 2017-03-28 NOTE — MR AVS SNAPSHOT
After Visit Summary   3/28/2017    Tricia Tamez    MRN: [id number]           Patient Information     Date Of Birth          1975        Visit Information        Provider Department      3/28/2017 10:45 AM Roslyn Corona; [facility] ULTRASOUND  Services Department        Today's Diagnoses     AMA (advanced maternal age) multigravida 35+, third trimester        Asymmetric IUGR affecting pregnancy, antepartum           Follow-ups after your visit        Your next 10 appointments already scheduled     Mar 31, 2017 11:00 AM CDT   ULTRASOUND with [facility] ULTRASOUND   [facility] ([facility])    [place] [facility] [address]  [address]  [facility] [address] [phone]            Apr 04, 2017 11:00 AM CDT   ULTRASOUND with [facility] ULTRASOUND II   [facility] ([facility])    [place] [facility] [address]  [address]  [facility] [address] [phone]            Apr 07, 2017 11:00 AM CDT   ULTRASOUND with [facility] ULTRASOUND   [facility] ([facility])    [place] [facility] [address]  [address]  [facility] [address] [phone]            Apr 07, 2017  1:15 PM CDT   RETURN OB with Marisa Shabazz MD   [facility] ([facility])    [place] [facility] [address]  [address]  [facility] [address] [phone]              Who to contact     Please call your clinic at [phone] to:    Ask questions about your health    Make or cancel appointments    Discuss your medicines    Learn about your test results    Speak to your doctor   If you have compliments or concerns about an experience at your clinic, or if you wish to file a complaint, please contact [facility] Patient Relations at [phone] or email us at  [email or web address]         Additional Information About Your Visit        Caspidahart Information     Connexin Softwaret is an electronic gateway that provides easy, online access to your medical records. With Telik, you can request a clinic appointment, read your test results, renew a prescription or communicate with your care team.     To sign up for Telik visit the website at [email or web address]   You will be asked to enter the access code listed below, as well as some personal information. Please follow the directions to create your username and password.     Your access code is: [email or web address]  Expires: 2017 10:00 AM     Your access code will  in 90 days. If you need help or a new code, please contact your [facility] or call [phone] for assistance.        Care EveryWhere ID     This is your Care EveryWhere ID. This could be used by other organizations to access your [place] medical records  [id number]        Your Vitals Were     Last Period                   2016            Blood Pressure from Last 3 Encounters:   17 114/71   17 130/80   03/15/17 114/75    Weight from Last 3 Encounters:   17 84.7 kg (186 lb 11.2 oz)   17 84.3 kg (185 lb 12.8 oz)   03/15/17 84.5 kg (186 lb 4.8 oz)              We Performed the Following     BPP (Single) w/out NST (In Clinic)     OB Doppler - UAR (Single)  (In Clinic)        Primary Care Provider Office Phone # Fax #    Lilly Cartwright [phone]       [facility] [address]  [facility] [address]        Thank you!     Thank you for choosing [facility]  for your care. Our goal is always to provide you with excellent care. Hearing back from our patients is one way we can continue to improve our services. Please take a few minutes to complete the written survey that you may receive in the mail after your visit with us. Thank you!             Your  Updated Medication List - Protect others around you: Learn how to safely use, store and throw away your medicines at www.disposemymeds.org.          This list is accurate as of: 3/28/17  2:30 PM.  Always use your most recent med list.                   Brand Name Dispense Instructions for use    prenatal multivitamin  plus iron 27-0.8 MG Tabs per tablet     30 tablet    Take 1 tablet by mouth daily       vitamin D 2000 UNITS Caps     90 capsule    Take 1 capsule by mouth daily Take one capsule daily.

## 2017-03-28 NOTE — PROGRESS NOTES
41 year old,  , presents at 39 4/7 weeks in pregnancy complicated by asymmetric IUGR, AMA   for biophysical assessment.    Fetal Breathing Movements (FBM): Normal - 2  Gross Body Movements (GBM): Normal - 2  Fetal Tone (FT): Normal - 2  AFV: Pocket of amniotic fluid > or = to 2 cm x 2 cm - 2  Quantitative Amniotic Fluid Volume Total: 15.9 cm      BPP 8/8.  FHR = 149BPM Normal.     S/D 1.97= WNL  MCA Not done.  NST Not done.     Single fetus in cephalic presentation.  Placenta anterior and grade 1.    IOL was recommended again today and patient declined.  Recommend continued 2x/ week BPP w/ dopplers assessments until delivery.    ORIANA Rodney MD, FACOG  [facility]  OB/GYN    3/28/2017  2:29 PM

## 2017-03-28 NOTE — PROGRESS NOTES
"S:  Tricia Tamez is a 40yo  at 39w4d presents for ROBERTO, pregnancy complicated by asymmetric IUGR and history of Oligohydramnios (most recent LIBERTY normal). Patient reports +FM, denies VB, CTX, LOF. Patient declines IOL at this time, states other pregnancies have been 40+ weeks. Reports understanding of dx of IUGR but desires to wait for spontaneous labor.  No HA, visual changes, edema, RUQ or epigastric pain. Continues bi-weekly BPP monitoring - Last today 3/28  -Declines cervical exam today.  -Patient has a history of latent TB treated for 2 months per chart review, should address this after delivery    O  /71  Pulse 91  Ht 1.549 m (5' 1\")  Wt 84.7 kg (186 lb 11.2 oz)  LMP 2016  BMI 35.28 kg/m2  Gen: Alert, pleasant, cooperative, no acute distress  CV: RRR  Lungs: Clear   Abdomen: Soft, non-tender, Gravid at 39cm  Extremities: No peripheral edema    A/P: Tricia Tamez is a 40yo  at 39w4d presents for ROBERTO    #PNC:  -Continue biweekly BPP to monitor fetal growth and LIBERTY - reassuring today  -Discussed options for IOL with patient, she was not interested in this at this time.  -Discussed signs and symptoms of Pre-E with patient and to contact provider if headache/visual changes/RUQ or epigastric pain, decreased fetal movement, vaginal bleeding, leakage of fluid or strong/regular contractions.    #Latent TB   -This should be addressed at post partum visit with TST or chest xray    RTC or L&D in 1 week or when in active labor    I, Melina Rebolledo MS3, acted as scribe for Rika Vaz MD during this visit. All aspects of the exam and documentation were approved by the attending physician.    I appreciate the note above by Melina Rebolledo MS3. I evaluated and examined the patient with the medical student who acted as scribe for this visit. I agree with all history, exam, findings, assessment and plan as documented. Any necessary changes have been made by me.  Rika Vaz MD MPH        "

## 2017-03-31 ENCOUNTER — OFFICE VISIT (OUTPATIENT)
Dept: OBGYN | Facility: CLINIC | Age: 42
End: 2017-03-31
Attending: OBSTETRICS & GYNECOLOGY
Payer: COMMERCIAL

## 2017-03-31 DIAGNOSIS — O36.5990 ASYMMETRIC IUGR AFFECTING PREGNANCY, ANTEPARTUM: ICD-10-CM

## 2017-03-31 DIAGNOSIS — O09.523 AMA (ADVANCED MATERNAL AGE) MULTIGRAVIDA 35+, THIRD TRIMESTER: ICD-10-CM

## 2017-03-31 PROCEDURE — 76820 UMBILICAL ARTERY ECHO: CPT | Mod: ZF

## 2017-03-31 PROCEDURE — T1013 SIGN LANG/ORAL INTERPRETER: HCPCS | Mod: U3,ZF

## 2017-03-31 PROCEDURE — 76819 FETAL BIOPHYS PROFIL W/O NST: CPT | Mod: ZF

## 2017-03-31 NOTE — MR AVS SNAPSHOT
After Visit Summary   3/31/2017    Tricia Tamez    MRN: [id number]           Patient Information     Date Of Birth          1975        Visit Information        Provider Department      3/31/2017 10:45 AM [place] [employer]; [facility] ULTRASOUND [facility]         Follow-ups after your visit        Your next 10 appointments already scheduled     Apr 04, 2017 11:00 AM CDT   ULTRASOUND with [facility] ULTRASOUND II   [facility] ([facility])    [place] [facility] [address]  [address]  [facility] [address] [phone]            Apr 07, 2017 11:00 AM CDT   ULTRASOUND with [facility] ULTRASOUND   [facility] ([facility])    [place] [facility] [address]  [address]  [facility] [address] [phone]            Apr 07, 2017  1:15 PM CDT   RETURN OB with Marisa Shabazz MD   [facility] ([facility])    [place] [facility]  [address]  [facility] [address] [phone]              Who to contact     Please call your clinic at [phone] to:    Ask questions about your health    Make or cancel appointments    Discuss your medicines    Learn about your test results    Speak to your doctor   If you have compliments or concerns about an experience at your clinic, or if you wish to file a complaint, please contact [facility] Patient Relations at [phone] or email us at [email or web address]         Additional Information About Your Visit        Realtime Worldshart Information     Coskata is an electronic gateway that provides easy, online access to your medical records. With Coskata, you can request a clinic appointment, read your test results, renew a prescription or communicate with your care team.     To sign up for Coskata visit the website at  [email or web address]   You will be asked to enter the access code listed below, as well as some personal information. Please follow the directions to create your username and password.     Your access code is: [email or web address]  Expires: 2017 10:00 AM     Your access code will  in 90 days. If you need help or a new code, please contact your [facility] or call [phone] for assistance.        Care EveryWhere ID     This is your Care EveryWhere ID. This could be used by other organizations to access your [place] medical records  [id number]        Your Vitals Were     Last Period                   2016            Blood Pressure from Last 3 Encounters:   17 114/71   17 130/80   03/15/17 114/75    Weight from Last 3 Encounters:   17 84.7 kg (186 lb 11.2 oz)   17 84.3 kg (185 lb 12.8 oz)   03/15/17 84.5 kg (186 lb 4.8 oz)              Today, you had the following     No orders found for display       Primary Care Provider Office Phone # Fax #    Lilly Cartwright [phone]       [place] [address]        Thank you!     Thank you for choosing [facility]  for your care. Our goal is always to provide you with excellent care. Hearing back from our patients is one way we can continue to improve our services. Please take a few minutes to complete the written survey that you may receive in the mail after your visit with us. Thank you!             Your Updated Medication List - Protect others around you: Learn how to safely use, store and throw away your medicines at www.disposemymeds.org.          This list is accurate as of: 3/31/17 11:32 AM.  Always use your most recent med list.                   Brand Name Dispense Instructions for use    prenatal multivitamin  plus iron 27-0.8 MG Tabs per tablet     30 tablet    Take 1 tablet by mouth daily       vitamin D 2000 UNITS Caps      90 capsule    Take 1 capsule by mouth daily Take one capsule daily.

## 2017-03-31 NOTE — PROGRESS NOTES
41 year old,  , presents at 40 0/7 weeks in pregnancy complicated by AMA, asymmetric IUGR for biophysical assessment.    Fetal Breathing Movements (FBM): Normal - 2  Gross Body Movements (GBM): Normal - 2  Fetal Tone (FT): Normal - 2  AFV: Pocket of amniotic fluid > or = to 2 cm x 2 cm - 2  Quantitative Amniotic Fluid Volume Total: 14.8 cm      BPP 8/8.  UAR = 1.88 Normal.   MCA Not done.  NST Not done.     Single fetus in cephalic presentation.  Placenta anterior and grade 1.    Recommend twice weekly assessment.    ORIANA Wilcox

## 2017-03-31 NOTE — LETTER
3/31/2017       RE: Tricia Tamez  [address]  [facility] [address]     Dear Colleague,    Thank you for referring your patient, Tricia Tamez, to the [facility] at [facility]. Please see a copy of my visit note below.    41 year old,  , presents at 40 0/7 weeks in pregnancy complicated by AMA, asymmetric IUGR for biophysical assessment.    Fetal Breathing Movements (FBM): Normal - 2  Gross Body Movements (GBM): Normal - 2  Fetal Tone (FT): Normal - 2  AFV: Pocket of amniotic fluid > or = to 2 cm x 2 cm - 2  Quantitative Amniotic Fluid Volume Total: 14.8 cm      BPP 8/8.  UAR = 1.88 Normal.   MCA Not done.  NST Not done.     Single fetus in cephalic presentation.  Placenta anterior and grade 1.    Recommend twice weekly assessment.    ORIANA Wilcox

## 2017-04-04 ENCOUNTER — OFFICE VISIT (OUTPATIENT)
Dept: OBGYN | Facility: CLINIC | Age: 42
End: 2017-04-04
Attending: OBSTETRICS & GYNECOLOGY
Payer: MEDICAID

## 2017-04-04 DIAGNOSIS — O36.5990 ASYMMETRIC IUGR AFFECTING PREGNANCY, ANTEPARTUM: ICD-10-CM

## 2017-04-04 DIAGNOSIS — O09.523 AMA (ADVANCED MATERNAL AGE) MULTIGRAVIDA 35+, THIRD TRIMESTER: ICD-10-CM

## 2017-04-04 PROCEDURE — 76820 UMBILICAL ARTERY ECHO: CPT | Mod: ZF

## 2017-04-04 PROCEDURE — 76819 FETAL BIOPHYS PROFIL W/O NST: CPT | Mod: ZF

## 2017-04-04 PROCEDURE — T1013 SIGN LANG/ORAL INTERPRETER: HCPCS | Mod: U3,ZF

## 2017-04-04 NOTE — MR AVS SNAPSHOT
After Visit Summary   4/4/2017    Tricia Tamez    MRN: [id number]           Patient Information     Date Of Birth          1975        Visit Information        Provider Department      4/4/2017 10:45 AM [employer]; [facility] ULTRASOUND II [facility]        Today's Diagnoses     AMA (advanced maternal age) multigravida 35+, third trimester        Asymmetric IUGR affecting pregnancy, antepartum           Follow-ups after your visit        Your next 10 appointments already scheduled     Apr 07, 2017 11:00 AM CDT   ULTRASOUND with [facility] ULTRASOUND   [facility] ([facility])    [place] [facility] [address]  [address]  [facility] [address] [phone]            Apr 07, 2017  1:15 PM CDT   RETURN OB with Marisa Shabazz MD   [facility] ([facility])    [place] [facility] [address]  [address]  [facility] [address] [phone]              Who to contact     Please call your clinic at [phone] to:    Ask questions about your health    Make or cancel appointments    Discuss your medicines    Learn about your test results    Speak to your doctor   If you have compliments or concerns about an experience at your clinic, or if you wish to file a complaint, please contact [facility] Physicians Patient Relations at [phone] or email us at [email or web address]         Additional Information About Your Visit        MyChart Information     Savi Healtht is an electronic gateway that provides easy, online access to your medical records. With Omiro, you can request a clinic appointment, read your test results, renew a prescription or communicate with your care team.     To sign up for Savi Healtht visit the website at [email or web address]   You will be asked to enter the access code listed below, as well as some personal  information. Please follow the directions to create your username and password.     Your access code is: [email or web address]  Expires: 2017 10:00 AM     Your access code will  in 90 days. If you need help or a new code, please contact your [facility] or call [phone] for assistance.        Care EveryWhere ID     This is your Care EveryWhere ID. This could be used by other organizations to access your [place] medical records  [id number]        Your Vitals Were     Last Period                   2016            Blood Pressure from Last 3 Encounters:   17 114/71   17 130/80   03/15/17 114/75    Weight from Last 3 Encounters:   17 84.7 kg (186 lb 11.2 oz)   17 84.3 kg (185 lb 12.8 oz)   03/15/17 84.5 kg (186 lb 4.8 oz)              We Performed the Following     BPP (Single) w/out NST (In Clinic)     OB Doppler - UAR (Single)  (In Clinic)        Primary Care Provider Office Phone # Fax #    Lilly Cartwright [phone]       [place] [address] [facility] [address]        Thank you!     Thank you for choosing [facility]  for your care. Our goal is always to provide you with excellent care. Hearing back from our patients is one way we can continue to improve our services. Please take a few minutes to complete the written survey that you may receive in the mail after your visit with us. Thank you!             Your Updated Medication List - Protect others around you: Learn how to safely use, store and throw away your medicines at www.disposemymeds.org.          This list is accurate as of: 17 12:38 PM.  Always use your most recent med list.                   Brand Name Dispense Instructions for use    prenatal multivitamin  plus iron 27-0.8 MG Tabs per tablet     30 tablet    Take 1 tablet by mouth daily       vitamin D 2000 UNITS Caps     90 capsule    Take 1 capsule by mouth daily Take one capsule  daily.

## 2017-04-04 NOTE — LETTER
2017       RE: Tricia Tamez  [address]  [facility] [address]     Dear Colleague,    Thank you for referring your patient, Tricia Tamez, to the [facility] at [facility]. Please see a copy of my visit note below.    41 year old,  , presents at 40 4/7 weeks in pregnancy complicated by AMA, asymmetric IUGR, post dates for biophysical assessment.    Fetal Breathing Movements (FBM): Normal - 2  Gross Body Movements (GBM): Normal - 2  Fetal Tone (FT): Normal - 2  AFV: Pocket of amniotic fluid > or = to 2 cm x 2 cm - 2  Quantitative Amniotic Fluid Volume Total: 16.3 cm    BPP 8/8.  UAR = 2.1 Normal.   MCA Not done.  NST Not done.     Single fetus in cephalic presentation.  Placenta anterior and grade 1.    Recommend twice weekly BPP assessment, patient should be offered induction of labor at any time.    ORIANA Wilcox MD      [employer]

## 2017-04-04 NOTE — PROGRESS NOTES
41 year old,  , presents at 40 4/7 weeks in pregnancy complicated by AMA, asymmetric IUGR, post dates for biophysical assessment.    Fetal Breathing Movements (FBM): Normal - 2  Gross Body Movements (GBM): Normal - 2  Fetal Tone (FT): Normal - 2  AFV: Pocket of amniotic fluid > or = to 2 cm x 2 cm - 2  Quantitative Amniotic Fluid Volume Total: 16.3 cm    BPP 8/8.  UAR = 2.1 Normal.   MCA Not done.  NST Not done.     Single fetus in cephalic presentation.  Placenta anterior and grade 1.    Recommend twice weekly BPP assessment, patient should be offered induction of labor at any time.    ORIANA Wilcox MD

## 2017-04-07 ENCOUNTER — OFFICE VISIT (OUTPATIENT)
Dept: OBGYN | Facility: CLINIC | Age: 42
End: 2017-04-07
Attending: OBSTETRICS & GYNECOLOGY
Payer: MEDICAID

## 2017-04-07 VITALS
SYSTOLIC BLOOD PRESSURE: 115 MMHG | HEART RATE: 95 BPM | WEIGHT: 182 LBS | DIASTOLIC BLOOD PRESSURE: 76 MMHG | BODY MASS INDEX: 34.39 KG/M2

## 2017-04-07 DIAGNOSIS — O36.5990 ASYMMETRIC IUGR AFFECTING PREGNANCY, ANTEPARTUM: ICD-10-CM

## 2017-04-07 DIAGNOSIS — O36.5990 ASYMMETRIC IUGR AFFECTING PREGNANCY, ANTEPARTUM: Primary | ICD-10-CM

## 2017-04-07 DIAGNOSIS — O09.523 ELDERLY MULTIGRAVIDA IN THIRD TRIMESTER: Primary | ICD-10-CM

## 2017-04-07 DIAGNOSIS — Z87.59 HISTORY OF PRE-ECLAMPSIA: ICD-10-CM

## 2017-04-07 PROCEDURE — 99212 OFFICE O/P EST SF 10 MIN: CPT | Mod: 25,ZF

## 2017-04-07 PROCEDURE — T1013 SIGN LANG/ORAL INTERPRETER: HCPCS | Mod: U3,ZF | Performed by: OBSTETRICS & GYNECOLOGY

## 2017-04-07 PROCEDURE — T1013 SIGN LANG/ORAL INTERPRETER: HCPCS | Mod: U3,ZF

## 2017-04-07 PROCEDURE — 76820 UMBILICAL ARTERY ECHO: CPT | Mod: ZF

## 2017-04-07 PROCEDURE — 76819 FETAL BIOPHYS PROFIL W/O NST: CPT | Mod: ZF

## 2017-04-07 ASSESSMENT — PAIN SCALES - GENERAL: PAINLEVEL: NO PAIN (0)

## 2017-04-07 NOTE — PROGRESS NOTES
41 year old,  , presents at 41 0/7 weeks in pregnancy complicated by post dates, asymmetric IUGR, AMA, h/o pre-eclampsia for biophysical assessment.    Fetal Breathing Movements (FBM): Normal - 2  Gross Body Movements (GBM): Normal - 2  Fetal Tone (FT): Normal - 2  AFV: Pocket of amniotic fluid > or = to 2 cm x 2 cm - 2  Quantitative Amniotic Fluid Volume Total: 12.8 cm      BPP 8/8.  UAR = 2.07 Normal.   MCA Not done.  NST Not done.     Single fetus in cephalic presentation.  Placenta anterior and grade 2.    Recommend assessment in 3 days or delivery.    ORIANA Wilcox

## 2017-04-07 NOTE — PROGRESS NOTES
No complaints. No regular ctx.  No lof or bleeding. Denies HA or vision changes.     O: see flow    A/p: 40 yo  at 41+0 wks. H/o oliogo and asymetric IUGR.      1. Strongly recommend IOL again. Pt declines. Discussed risk for IUFD and concerns for placental insufficiency.   2. F/u next week, given that she declines IOL, for 2 x/ week BPP and rpt growth u/s  3. Labor precautions reviewed    Marisa Shabazz MD, FACOG  [facility]  OB/GYN    2017  3:44 PM

## 2017-04-07 NOTE — LETTER
2017       RE: Tricia Tamez  [address]  [facility] [address]     Dear Colleague,    Thank you for referring your patient, Tricia Tamez, to the [facility] at [facility]. Please see a copy of my visit note below.    41 year old,  , presents at 41 0/7 weeks in pregnancy complicated by post dates, asymmetric IUGR, AMA, h/o pre-eclampsia for biophysical assessment.    Fetal Breathing Movements (FBM): Normal - 2  Gross Body Movements (GBM): Normal - 2  Fetal Tone (FT): Normal - 2  AFV: Pocket of amniotic fluid > or = to 2 cm x 2 cm - 2  Quantitative Amniotic Fluid Volume Total: 12.8 cm      BPP 8/8.  UAR = 2.07 Normal.   MCA Not done.  NST Not done.     Single fetus in cephalic presentation.  Placenta anterior and grade 2.    Recommend assessment in 3 days or delivery.    ORIANA Wilcox        [facility] [facility]

## 2017-04-07 NOTE — LETTER
2017       RE: Tricia Tamez   [address]  [facility] [address]     Dear Colleague,    Thank you for referring your patient, Tricia Tamez, to the [facility] at [facility]. Please see a copy of my visit note below.    No complaints. No regular ctx.  No lof or bleeding. Denies HA or vision changes.     O: see flow    A/p: 42 yo  at 41+0 wks. H/o oliogo and asymetric IUGR.      1. Strongly recommend IOL again. Pt declines. Discussed risk for IUFD and concerns for placental insufficiency.   2. F/u next week, given that she declines IOL, for 2 x/ week BPP and rpt growth u/s  3. Labor precautions reviewed    Marisa Shabazz MD, FACOG  [facility]  OB/GYN    2017  3:44 PM          Marisa Shabazz MD

## 2017-04-07 NOTE — MR AVS SNAPSHOT
After Visit Summary   4/7/2017    Tricia Tamez    MRN: [id number]           Patient Information     Date Of Birth          1975        Visit Information        Provider Department      4/7/2017 10:45 AM [facility]; [facility] ULTRASOUND [facility]        Today's Diagnoses     Elderly multigravida in third trimester    -  1    History of pre-eclampsia        Asymmetric IUGR affecting pregnancy, antepartum           Follow-ups after your visit        Your next 10 appointments already scheduled     Apr 10, 2017  8:15 AM CDT   ULTRASOUND with [facility] ULTRASOUND, MULTILINGUAL WORD   [facility] ([facility])    [facility] [address]  [address]  [facility] [id number] [phone]            Apr 10, 2017  9:15 AM CDT   RETURN OB with Betsy Sawant MD, Assignments Open   [facility] ([facility])    [facility] [address]  [address]  [facility] [address] [phone]              Who to contact     Please call your clinic at [phone] to:    Ask questions about your health    Make or cancel appointments    Discuss your medicines    Learn about your test results    Speak to your doctor   If you have compliments or concerns about an experience at your clinic, or if you wish to file a complaint, please contact [facility] Patient Relations at [phone] or email us at [email or web address]         Additional Information About Your Visit        MyChart Information     IT Tradingt is an electronic gateway that provides easy, online access to your medical records. With Open Home Pro, you can request a clinic appointment, read your test results, renew a prescription or communicate with your care team.     To sign up for IT Tradingt visit the website at [email or web address]   You will be asked to enter the  access code listed below, as well as some personal information. Please follow the directions to create your username and password.     Your access code is: [email or web address]  Expires: 2017 10:00 AM     Your access code will  in 90 days. If you need help or a new code, please contact your [facility] or call [phone] for assistance.        Care EveryWhere ID     This is your Care EveryWhere ID. This could be used by other organizations to access your [place] medical records  [id number]        Your Vitals Were     Last Period                   2016            Blood Pressure from Last 3 Encounters:   17 115/76   17 114/71   17 130/80    Weight from Last 3 Encounters:   17 82.6 kg (182 lb)   17 84.7 kg (186 lb 11.2 oz)   17 84.3 kg (185 lb 12.8 oz)              We Performed the Following     BPP (Single) w/out NST (In Clinic)     OB Doppler - UAR (Single)  (In Clinic)        Primary Care Provider Office Phone # Fax #    Lilly Gabbie [phone]       [address] [address]        Thank you!     Thank you for choosing [facility]  for your care. Our goal is always to provide you with excellent care. Hearing back from our patients is one way we can continue to improve our services. Please take a few minutes to complete the written survey that you may receive in the mail after your visit with us. Thank you!             Your Updated Medication List - Protect others around you: Learn how to safely use, store and throw away your medicines at www.disposemymeds.org.          This list is accurate as of: 17 11:59 PM.  Always use your most recent med list.                   Brand Name Dispense Instructions for use    prenatal multivitamin  plus iron 27-0.8 MG Tabs per tablet     30 tablet    Take 1 tablet by mouth daily       vitamin D 2000 UNITS Caps     90 capsule    Take  1 capsule by mouth daily Take one capsule daily.

## 2017-04-07 NOTE — MR AVS SNAPSHOT
After Visit Summary   4/7/2017    Tricia Tamez    MRN: [id number]           Patient Information     Date Of Birth          1975        Visit Information        Provider Department      4/7/2017 1:00 PM Marisa Shabazz MD; [facility]        Today's Diagnoses     Asymmetric IUGR affecting pregnancy, antepartum    -  1       Follow-ups after your visit        Your next 10 appointments already scheduled     Apr 10, 2017  8:30 AM CDT   ULTRASOUND with [facility] ULTRASOUND   [facility] ([facility])    [facility] [address]  [address]  [facility] [id number] [phone]            Apr 10, 2017  9:15 AM CDT   RETURN OB with Betsy Sawant MD   [facility] ([facility])    [facility]  [address]  [facility] [id number] [phone]              Future tests that were ordered for you today     Open Future Orders        Priority Expected Expires Ordered    Growth Ultrasound 80413 Routine 4/10/2017 8/5/2017 4/7/2017            Who to contact     Please call your clinic at [phone] to:    Ask questions about your health    Make or cancel appointments    Discuss your medicines    Learn about your test results    Speak to your doctor   If you have compliments or concerns about an experience at your clinic, or if you wish to file a complaint, please contact [facility] Patient Relations at [phone] or email us at [email or web address]         Additional Information About Your Visit        Tapticat Information     SpikeSource is an electronic gateway that provides easy, online access to your medical records. With SpikeSource, you can request a clinic appointment, read your test results, renew a prescription or communicate with your care team.     To sign up for SpikeSource visit the  website at [email or web address]   You will be asked to enter the access code listed below, as well as some personal information. Please follow the directions to create your username and password.     Your access code is: [email or web address]  Expires: 2017 10:00 AM     Your access code will  in 90 days. If you need help or a new code, please contact your [facility] or call [phone] for assistance.        Care EveryWhere ID     This is your Care EveryWhere ID. This could be used by other organizations to access your [place] medical records  [id number]        Your Vitals Were     Pulse Last Period Breastfeeding? BMI (Body Mass Index)          95 2016 No 34.39 kg/m2         Blood Pressure from Last 3 Encounters:   17 115/76   17 114/71   17 130/80    Weight from Last 3 Encounters:   17 82.6 kg (182 lb)   17 84.7 kg (186 lb 11.2 oz)   17 84.3 kg (185 lb 12.8 oz)               Primary Care Provider Office Phone # Fax #    Lilly Cartwright [phone]       [place] [address]        Thank you!     Thank you for choosing [facility]  for your care. Our goal is always to provide you with excellent care. Hearing back from our patients is one way we can continue to improve our services. Please take a few minutes to complete the written survey that you may receive in the mail after your visit with us. Thank you!             Your Updated Medication List - Protect others around you: Learn how to safely use, store and throw away your medicines at www.disposemymeds.org.          This list is accurate as of: 17  1:51 PM.  Always use your most recent med list.                   Brand Name Dispense Instructions for use    prenatal multivitamin  plus iron 27-0.8 MG Tabs per tablet     30 tablet    Take 1 tablet by mouth daily       vitamin D 2000 UNITS Caps     90 capsule     Take 1 capsule by mouth daily Take one capsule daily.

## 2017-04-10 ENCOUNTER — OFFICE VISIT (OUTPATIENT)
Dept: OBGYN | Facility: CLINIC | Age: 42
End: 2017-04-10
Attending: OBSTETRICS & GYNECOLOGY
Payer: MEDICAID

## 2017-04-10 VITALS
WEIGHT: 182 LBS | BODY MASS INDEX: 34.36 KG/M2 | SYSTOLIC BLOOD PRESSURE: 123 MMHG | HEART RATE: 98 BPM | DIASTOLIC BLOOD PRESSURE: 80 MMHG | HEIGHT: 61 IN

## 2017-04-10 DIAGNOSIS — O09.523 AMA (ADVANCED MATERNAL AGE) MULTIGRAVIDA 35+, THIRD TRIMESTER: ICD-10-CM

## 2017-04-10 DIAGNOSIS — O36.5990 ASYMMETRIC IUGR AFFECTING PREGNANCY, ANTEPARTUM: ICD-10-CM

## 2017-04-10 DIAGNOSIS — O09.93 HIGH-RISK PREGNANCY, THIRD TRIMESTER: ICD-10-CM

## 2017-04-10 DIAGNOSIS — Z87.59 HISTORY OF PRE-ECLAMPSIA: ICD-10-CM

## 2017-04-10 DIAGNOSIS — O36.5990 ASYMMETRIC IUGR AFFECTING PREGNANCY, ANTEPARTUM: Primary | ICD-10-CM

## 2017-04-10 DIAGNOSIS — O09.30 SUPERVISION OF PREGNANCY WITH INSUFFICIENT ANTENATAL CARE: Primary | ICD-10-CM

## 2017-04-10 PROCEDURE — T1013 SIGN LANG/ORAL INTERPRETER: HCPCS | Mod: U3,ZF | Performed by: OBSTETRICS & GYNECOLOGY

## 2017-04-10 PROCEDURE — 76819 FETAL BIOPHYS PROFIL W/O NST: CPT | Mod: ZF

## 2017-04-10 PROCEDURE — 76820 UMBILICAL ARTERY ECHO: CPT | Mod: ZF

## 2017-04-10 PROCEDURE — T1013 SIGN LANG/ORAL INTERPRETER: HCPCS | Mod: U3,ZF

## 2017-04-10 PROCEDURE — 99212 OFFICE O/P EST SF 10 MIN: CPT | Mod: ZF

## 2017-04-10 NOTE — LETTER
4/10/2017       RE: Tricia Tamez   [address]  [facility] [address]     Dear Colleague,    Thank you for referring your patient, Tricia Tamez, to the [facility] at [facility]. Please see a copy of my visit note below.    ROBERTO Visit 4/10/17    S: Tricia Tamez is a  41 F  who presents at 41w3d in pregnancy complicated by AMA, asymmetric IUGR and post-dates for ROBERTO. She denies any new concerns today, no headache, vision changes, loss of fluid or bleeding, abdominal pain, urinary symptoms. She states baby is moving often and is in cephalic position, she had a BPP this morning that was 8/8. She has not felt any contractions, vaginal bleeding or lower abdominal cramping. Pt continues to decline IOL, citing Presybeterian reasons.     O:  See OB Flowsheet    A/P: 40 yo  presents for ROBERTO visit  1) Prenatal care: Rh positive, Dianne negative, Rubella immune, Remainder NOB labs and in CareEverywhere, GCT abnormal at 154, passed all 4 values of 3 hour GTT  2) AMA/Genetic testing: Declined genetic testing, Normal Level II US, has had serial growth scans and twice weekly BPP  3) Asymmetric IUGR and oligo: Diagnosed on MFM Scan on 3/3/17 and at that time recommendations for IOL at 37 weeks due to finding.  Patient has refused IOL to this point and aware of potential for IUFD.  BPP and growth today with 8/8 on BPP.  4) History of pre-eclampsia: Asymptomatic, normotensive, continue to monitor closely  5) GBS negative  6) Continue twice weekly BPP, next scheduled for Thursday, 17. Pt declined IOL on previous visits that was recommended since 37w for asymmetric IUGR, states she is considering IOL in 4 days (17) if she doesn't progress, but prefers to wait until her next BPP to decide.  RTC in 3 days for BPP, 1 week for BPP and ROBERTO visit if undelivered.    Bruce VAZQUEZ, 3rd year medical student am serving as a scribe; to document services personally  performed by Dr. Sawant based on data collection and the provider's statements to me.     Staff MD Note    I appreciate the note above by Bruce Carter, MS3. I agree with the PFSH and ROS as completed by the MS. The remainder of the encounter was performed by me and scribed by the MS. The scribed note accurately reflects my personal services and the decisions made by me.    Betsy Sawant MD

## 2017-04-10 NOTE — PROGRESS NOTES
41 year old,  , presents at 41 3/7 weeks in pregnancy complicated by AMA, asymmetric IUGR, post dates for biophysical assessment.    Fetal Breathing Movements (FBM): Normal - 2  Gross Body Movements (GBM): Normal - 2  Fetal Tone (FT): Normal - 2  AFV: Pocket of amniotic fluid > or = to 2 cm x 2 cm - 2  Quantitative Amniotic Fluid Volume Total: 10.3 cm    BPP 8/8.  UAR = 1.99 Normal.   MCA Not done.  NST Not done.     Single fetus in cephalic presentation.  Placenta anterior and grade 2.    Recommend twice weekly BPP assessment, q3-4 week growth assessment.    ORIANA Wilcox MD

## 2017-04-10 NOTE — PROGRESS NOTES
ROBERTO Visit 4/10/17    S: Tricia Tamez is a  41 F  who presents at 41w3d in pregnancy complicated by AMA, asymmetric IUGR and post-dates for ROBERTO. She denies any new concerns today, no headache, vision changes, loss of fluid or bleeding, abdominal pain, urinary symptoms. She states baby is moving often and is in cephalic position, she had a BPP this morning that was 8/8. She has not felt any contractions, vaginal bleeding or lower abdominal cramping. Pt continues to decline IOL, citing Yazdanism reasons.     O:  See OB Flowsheet    A/P: 40 yo  presents for ROBERTO visit  1) Prenatal care: Rh positive, Dianne negative, Rubella immune, Remainder NOB labs and in CareEverywhere, GCT abnormal at 154, passed all 4 values of 3 hour GTT  2) AMA/Genetic testing: Declined genetic testing, Normal Level II US, has had serial growth scans and twice weekly BPP  3) Asymmetric IUGR and oligo: Diagnosed on MFM Scan on 3/3/17 and at that time recommendations for IOL at 37 weeks due to finding.  Patient has refused IOL to this point and aware of potential for IUFD.  BPP and growth today with 8/8 on BPP.  4) History of pre-eclampsia: Asymptomatic, normotensive, continue to monitor closely  5) GBS negative  6) Continue twice weekly BPP, next scheduled for Thursday, 17. Pt declined IOL on previous visits that was recommended since 37w for asymmetric IUGR, states she is considering IOL in 4 days (17) if she doesn't progress, but prefers to wait until her next BPP to decide.  RTC in 3 days for BPP, 1 week for BPP and ROBERTO visit if undelivered.    I, Bruce Carter, 3rd year medical student am serving as a scribe; to document services personally performed by Dr. Sawant based on data collection and the provider's statements to me.     Staff MD Note    I appreciate the note above by Bruce Carter, MS3. I agree with the PFSH and ROS as completed by the MS. The remainder of the encounter was performed by me and scribed by the MS. The  scribed note accurately reflects my personal services and the decisions made by me.    Betsy Sawant MD

## 2017-04-10 NOTE — MR AVS SNAPSHOT
After Visit Summary   4/10/2017    Tricia Tamez    MRN: [id number]           Patient Information     Date Of Birth          1975        Visit Information        Provider Department      4/10/2017 9:15 AM Betsy Sawant MD; MULTILINGUAL WORD [facility]        Today's Diagnoses     High-risk pregnancy, third trimester    -  1       Follow-ups after your visit        Follow-up notes from your care team     Return in about 3 days (around 4/13/2017) for Needs twice weekly BPP, ROBERTO 1 week.      Your next 10 appointments already scheduled     Apr 13, 2017 10:00 AM CDT   [facility]   [facility] ([facility])    [facility] [address]  [address]  [facility] [address] [phone]            Apr 13, 2017 10:15 AM CDT   RETURN OB with Betsy Sawant MD   [facility] ([facility])    [facility] [address]  [address]  [facility] [address] [phone]              Who to contact     Please call your clinic at [phone] to:    Ask questions about your health    Make or cancel appointments    Discuss your medicines    Learn about your test results    Speak to your doctor   If you have compliments or concerns about an experience at your clinic, or if you wish to file a complaint, please contact [facility] Patient Relations at [phone] or email us at [email or web address]         Additional Information About Your Visit        MyChart Information     Kagera is an electronic gateway that provides easy, online access to your medical records. With Kagera, you can request a clinic appointment, read your test results, renew a prescription or communicate with your care team.     To sign up for [employer] visit the website at [email or web address]   You will be asked to enter the access  "code listed below, as well as some personal information. Please follow the directions to create your username and password.     Your access code is: [email or web address]  Expires: 2017 10:00 AM     Your access code will  in 90 days. If you need help or a new code, please contact your [facility] or call [phone] for assistance.        Care EveryWhere ID     This is your Care EveryWhere ID. This could be used by other organizations to access your [place] medical records  [id number]        Your Vitals Were     Pulse Height Last Period BMI (Body Mass Index)          98 1.549 m (5' 1\") 2016 34.39 kg/m2         Blood Pressure from Last 3 Encounters:   04/10/17 123/80   17 115/76   17 114/71    Weight from Last 3 Encounters:   04/10/17 82.6 kg (182 lb)   17 82.6 kg (182 lb)   17 84.7 kg (186 lb 11.2 oz)              Today, you had the following     No orders found for display       Primary Care Provider Office Phone # Fax #    Lilly Carwtright [phone]       [address] [address]        Thank you!     Thank you for choosing [facility]  for your care. Our goal is always to provide you with excellent care. Hearing back from our patients is one way we can continue to improve our services. Please take a few minutes to complete the written survey that you may receive in the mail after your visit with us. Thank you!             Your Updated Medication List - Protect others around you: Learn how to safely use, store and throw away your medicines at www.disposemymeds.org.          This list is accurate as of: 4/10/17  9:41 AM.  Always use your most recent med list.                   Brand Name Dispense Instructions for use    prenatal multivitamin  plus iron 27-0.8 MG Tabs per tablet     30 tablet    Take 1 tablet by mouth daily       vitamin D 2000 UNITS Caps     90 capsule    Take 1 " capsule by mouth daily Take one capsule daily.

## 2017-04-10 NOTE — LETTER
4/10/2017       RE: Tricia Tamez  [address]  [facility] [address]     Dear Colleague,    Thank you for referring your patient, Tricia Tamez, to the [facility] at [facility]. Please see a copy of my visit note below.    41 year old,  , presents at 41 3/7 weeks in pregnancy complicated by AMA, asymmetric IUGR, post dates for biophysical assessment.    Fetal Breathing Movements (FBM): Normal - 2  Gross Body Movements (GBM): Normal - 2  Fetal Tone (FT): Normal - 2  AFV: Pocket of amniotic fluid > or = to 2 cm x 2 cm - 2  Quantitative Amniotic Fluid Volume Total: 10.3 cm    BPP 8/8.  UAR = 1.99 Normal.   MCA Not done.  NST Not done.     Single fetus in cephalic presentation.  Placenta anterior and grade 2.    Recommend twice weekly BPP assessment, q3-4 week growth assessment.    ORIANA Wilcox MD

## 2017-04-10 NOTE — MR AVS SNAPSHOT
After Visit Summary   4/10/2017    Tricia Tamez    MRN: [id number]           Patient Information     Date Of Birth          1975        Visit Information        Provider Department      4/10/2017 8:15 AM MULTILINGUAL WORD; [facility] ULTRASOUND [facility]        Today's Diagnoses     Asymmetric IUGR affecting pregnancy, antepartum    -  1    History of pre-eclampsia        AMA (advanced maternal age) multigravida 35+, third trimester           Follow-ups after your visit        Your next 10 appointments already scheduled     Apr 13, 2017 10:00 AM CDT   ULTRASOUND with [facility] ULTRASOUND II   [facility] ([facility])    [facility] [address]  [address]  [facility] [address] [phone]            Apr 13, 2017 10:15 AM CDT   RETURN OB with Betsy Sawant MD   [facility] ([facility])    [facility] [address]  [address]  [facility] [address] [phone]              Who to contact     Please call your clinic at [phone] to:    Ask questions about your health    Make or cancel appointments    Discuss your medicines    Learn about your test results    Speak to your doctor   If you have compliments or concerns about an experience at your clinic, or if you wish to file a complaint, please contact [facility] Phys[facility] Patient Relations at [phone] or email us at [email or web address]         Additional Information About Your Visit        MyChart Information     Exercise.com is an electronic gateway that provides easy, online access to your medical records. With Exercise.com, you can request a clinic appointment, read your test results, renew a prescription or communicate with your care team.     To sign up for Exercise.[email or web address] visit the website at [email or web address]   You will be asked to enter the access code listed below,  as well as some personal information. Please follow the directions to create your username and password.     Your access code is: [email or web address]  Expires: 2017 10:00 AM     Your access code will  in 90 days. If you need help or a new code, please contact your [facility] or call [phone] for assistance.        Care EveryWhere ID     This is your Care EveryWhere ID. This could be used by other organizations to access your [place] medical records  [id number]        Your Vitals Were     Last Period                   2016            Blood Pressure from Last 3 Encounters:   04/10/17 123/80   17 115/76   17 114/71    Weight from Last 3 Encounters:   04/10/17 82.6 kg (182 lb)   17 82.6 kg (182 lb)   17 84.7 kg (186 lb 11.2 oz)              We Performed the Following     BPP (Single) w/out NST (In Clinic)     OB Doppler - UAR (Single)  (In Clinic)        Primary Care Provider Office Phone # Fax #    Lilly Cartwright [phone]       [place] [address] [address] [facility] [address]        Thank you!     Thank you for choosing [facility]  for your care. Our goal is always to provide you with excellent care. Hearing back from our patients is one way we can continue to improve our services. Please take a few minutes to complete the written survey that you may receive in the mail after your visit with us. Thank you!             Your Updated Medication List - Protect others around you: Learn how to safely use, store and throw away your medicines at www.disposemymeds.org.          This list is accurate as of: 4/10/17 10:53 AM.  Always use your most recent med list.                   Brand Name Dispense Instructions for use    prenatal multivitamin  plus iron 27-0.8 MG Tabs per tablet     30 tablet    Take 1 tablet by mouth daily       vitamin D 2000 UNITS Caps     90 capsule    Take 1 capsule by mouth daily Take  one capsule daily.

## 2017-04-13 ENCOUNTER — OFFICE VISIT (OUTPATIENT)
Dept: OBGYN | Facility: CLINIC | Age: 42
End: 2017-04-13
Attending: OBSTETRICS & GYNECOLOGY
Payer: MEDICAID

## 2017-04-13 VITALS
BODY MASS INDEX: 34.36 KG/M2 | HEIGHT: 61 IN | HEART RATE: 93 BPM | SYSTOLIC BLOOD PRESSURE: 119 MMHG | WEIGHT: 182 LBS | DIASTOLIC BLOOD PRESSURE: 79 MMHG

## 2017-04-13 DIAGNOSIS — O09.523 AMA (ADVANCED MATERNAL AGE) MULTIGRAVIDA 35+, THIRD TRIMESTER: ICD-10-CM

## 2017-04-13 DIAGNOSIS — O09.93 HIGH-RISK PREGNANCY, THIRD TRIMESTER: Primary | ICD-10-CM

## 2017-04-13 DIAGNOSIS — O48.0 POST-TERM PREGNANCY, 40-42 WEEKS OF GESTATION: ICD-10-CM

## 2017-04-13 DIAGNOSIS — O36.5990 ASYMMETRIC IUGR AFFECTING PREGNANCY, ANTEPARTUM: Primary | ICD-10-CM

## 2017-04-13 PROCEDURE — T1013 SIGN LANG/ORAL INTERPRETER: HCPCS | Mod: U3,ZF

## 2017-04-13 PROCEDURE — 76819 FETAL BIOPHYS PROFIL W/O NST: CPT | Mod: ZF

## 2017-04-13 NOTE — LETTER
2017       RE: Tricia Tamez  [address]  [place] [facility] [address]     Dear Colleague,    Thank you for referring your patient, Tricia Tamez, to the [facility] at [facility]. Please see a copy of my visit note below.    ROBERTO Visit 17    S: Tricia Tamez is a 41 year old  who presents at 41w6d in pregnancy complicated by post dates, AMA, asymmetric IUGR and oligohydramnios. Pt states she is continuing to do well, she denies any contractions, changes in vaginal discharge, gush of fluid or vaginal bleeding. She is only experiencing pelvic pressure due to cephalic positioning of baby. She denies any headaches, vision changes, reflux, upper abdominal symptoms, urinary symptoms, or any other concerns. She continues to decline IOL, wanting God to decide when baby will deliver.     O:  See OB Flowsheet    Declined cervical exam    A/P: 42 yo  @ 41w6d presents for ROBERTO visit  1) Prenatal care: Rh positive, Dianne negative, Rubella immune, Remainder NOB labs and in CareEverywhere, GCT abnormal at 154, passed all 4 values of 3 hour GTT  2) AMA/Genetic testing: Declined genetic testing, Normal Level II US, has had serial growth scans and twice weekly BPP  3) Asymmetric IUGR and oligo: Diagnosed on MFM Scan on 3/3/17 and at that time recommendations for IOL at 37 weeks due to finding. Patient has refused IOL to this point and aware of potential for IUFD. BPP today was 8/8.  Will continue twice weekly testing, growth at next ultrasound.  Patient aware we are happy to induce her at any time if she changes her mind between now and next visit.  4) History of pre-eclampsia: Asymptomatic, normotensive, continue to monitor closely  5) GBS negative   6) Discussed IOL with patient today, strongly suggest IOL and she continues to decline IOL. RTC in 4 days (if undelivered) for BPP and growth ultrasound given IUGR and ROBERTO visit.    Bruce VAZQUEZ, 3rd year medical  student am serving as a scribe; to document services personally performed by Dr. Sawant based on data collection and the provider's statements to me.     Staff MD Note    I appreciate the note above by Flynn Carter MS3. I agree with the PFSH and ROS as completed by the MS. The remainder of the encounter was performed by me and scribed by the MS. The scribed note accurately reflects my personal services and the decisions made by me.    Betsy Sawant MD

## 2017-04-13 NOTE — MR AVS SNAPSHOT
After Visit Summary   2017    Tricia Tamez    MRN: [id number]           Patient Information     Date Of Birth          1975        Visit Information        Provider Department      2017 10:00 AM Roslyn Corona; [facility] ULTRASOUND II  Services Department        Today's Diagnoses     Asymmetric IUGR affecting pregnancy, antepartum    -  1    AMA (advanced maternal age) multigravida 35+, third trimester        Post-term pregnancy, 40-42 weeks of gestation           Follow-ups after your visit        Who to contact     Please call your clinic at [phone] to:    Ask questions about your health    Make or cancel appointments    Discuss your medicines    Learn about your test results    Speak to your doctor   If you have compliments or concerns about an experience at your clinic, or if you wish to file a complaint, please contact [facility] Physicians Patient Relations at [phone] or email us at [email or web address]         Additional Information About Your Visit        MyChart Information     Ticket Mavrixt is an electronic gateway that provides easy, online access to your medical records. With delicious, you can request a clinic appointment, read your test results, renew a prescription or communicate with your care team.     To sign up for T[employer] Mavrixt visit the website at [email or web address]   You will be asked to enter the access code listed below, as well as some personal information. Please follow the directions to create your username and password.     Your access code is: [email or web address]  Expires: 2017 10:00 AM     Your access code will  in 90 days. If you need help or a new code, please contact your [facility] or call [phone] for assistance.        Care EveryWhere ID     This is your Care EveryWhere ID. This could be used by other organizations to access your [place] medical records  [id number]         Your Vitals Were     Last Period                   06/24/2016            Blood Pressure from Last 3 Encounters:   04/13/17 119/79   04/10/17 123/80   04/07/17 115/76    Weight from Last 3 Encounters:   04/13/17 82.6 kg (182 lb)   04/10/17 82.6 kg (182 lb)   04/07/17 82.6 kg (182 lb)              We Performed the Following     BPP (Single) w/out NST (In Clinic)        Primary Care Provider Office Phone # Fax #    Lilly Cartwright [phone]       [facility] [address]  [facility] [address]        Thank you!     Thank you for choosing [facility]  for your care. Our goal is always to provide you with excellent care. Hearing back from our patients is one way we can continue to improve our services. Please take a few minutes to complete the written survey that you may receive in the mail after your visit with us. Thank you!             Your Updated Medication List - Protect others around you: Learn how to safely use, store and throw away your medicines at www.disposemymeds.org.          This list is accurate as of: 4/13/17 10:44 AM.  Always use your most recent med list.                   Brand Name Dispense Instructions for use    prenatal multivitamin  plus iron 27-0.8 MG Tabs per tablet     30 tablet    Take 1 tablet by mouth daily       vitamin D 2000 UNITS Caps     90 capsule    Take 1 capsule by mouth daily Take one capsule daily.

## 2017-04-13 NOTE — PROGRESS NOTES
41 year old,  , presents at 41 6/7 weeks in pregnancy complicated by post dates, AMA, asymmetric IUGR for biophysical assessment.    Fetal Breathing Movements (FBM): Normal - 2  Gross Body Movements (GBM): Normal - 2  Fetal Tone (FT): Normal - 2  AFV: Pocket of amniotic fluid > or = to 2 cm x 2 cm - 2  Quantitative Amniotic Fluid Volume Total: 11.2 cm      BPP 8/8.  FHR = 164bpm Normal.   MCA Not done.  NST Not done.     SIngle fetus in cephalic presentation.  Placenta anterior and grade 2.    Recommend induction of labor and if refused continue twice weekly assessment and growth ultrasound at next visit    ORIANA Wilcox MD

## 2017-04-13 NOTE — PROGRESS NOTES
ROBERTO Visit 17    S: Tricia Tamez is a 41 year old  who presents at 41w6d in pregnancy complicated by post dates, AMA, asymmetric IUGR and oligohydramnios. Pt states she is continuing to do well, she denies any contractions, changes in vaginal discharge, gush of fluid or vaginal bleeding. She is only experiencing pelvic pressure due to cephalic positioning of baby. She denies any headaches, vision changes, reflux, upper abdominal symptoms, urinary symptoms, or any other concerns. She continues to decline IOL, wanting God to decide when baby will deliver.     O:  See OB Flowsheet    Declined cervical exam    A/P: 40 yo  @ 41w6d presents for ROBERTO visit  1) Prenatal care: Rh positive, Dianne negative, Rubella immune, Remainder NOB labs and in CareEverywhere, GCT abnormal at 154, passed all 4 values of 3 hour GTT  2) AMA/Genetic testing: Declined genetic testing, Normal Level II US, has had serial growth scans and twice weekly BPP  3) Asymmetric IUGR and oligo: Diagnosed on MFM Scan on 3/3/17 and at that time recommendations for IOL at 37 weeks due to finding. Patient has refused IOL to this point and aware of potential for IUFD. BPP today was 8/8.  Will continue twice weekly testing, growth at next ultrasound.  Patient aware we are happy to induce her at any time if she changes her mind between now and next visit.  4) History of pre-eclampsia: Asymptomatic, normotensive, continue to monitor closely  5) GBS negative   6) Discussed IOL with patient today, strongly suggest IOL and she continues to decline IOL. RTC in 4 days (if undelivered) for BPP and growth ultrasound given IUGR and ROBERTO visit.    I, Bruce Carter, 3rd year medical student am serving as a scribe; to document services personally performed by Dr. Sawant based on data collection and the provider's statements to me.     Staff MD Note    I appreciate the note above by Flynn Carter, MS3. I agree with the PFSH and ROS as completed by the MS. The remainder  of the encounter was performed by me and scribed by the MS. The scribed note accurately reflects my personal services and the decisions made by me.    Betsy Sawant MD

## 2017-04-13 NOTE — MR AVS SNAPSHOT
After Visit Summary   4/13/2017    Tricia Tamez    MRN: [id number]           Patient Information     Date Of Birth          1975        Visit Information        Provider Department      4/13/2017 10:15 AM Betsy Sawant MD [facility]        Today's Diagnoses     High-risk pregnancy, third trimester    -  1       Follow-ups after your visit        Follow-up notes from your care team     Return in 4 days (on 4/17/2017) for Growth US, BPP and ROBERTO visit.      Your next 10 appointments already scheduled     Apr 17, 2017 11:30 AM CDT   ULTRASOUND with [facility] ULTRASOUND, [place] [facility]   [facility] ([facility])    [facility] [address]  [address]  [facility] [address] [phone]            Apr 17, 2017  1:00 PM CDT   RETURN OB with Marisa Shabazz MD, [facility] ([facility])    [facility]  [address]  [facility] [address] [phone]              Who to contact     Please call your clinic at [phone] to:    Ask questions about your health    Make or cancel appointments    Discuss your medicines    Learn about your test results    Speak to your doctor   If you have compliments or concerns about an experience at your clinic, or if you wish to file a complaint, please contact [facility] Patient Relations at [phone] or email us at [email or web address]         Additional Information About Your Visit        MyChart Information     SpotRight is an electronic gateway that provides easy, online access to your medical records. With SpotRight, you can request a clinic appointment, read your test results, renew a prescription or communicate with your care team.     To sign up for SpotRight visit the website at [email or web address]   You will  "be asked to enter the access code listed below, as well as some personal information. Please follow the directions to create your username and password.     Your access code is: [email or web address]  Expires: 2017 10:00 AM     Your access code will  in 90 days. If you need help or a new code, please contact your [facility] or call [phone] for assistance.        Care EveryWhere ID     This is your Care EveryWhere ID. This could be used by other organizations to access your [place] medical records  [id number]        Your Vitals Were     Pulse Height Last Period BMI (Body Mass Index)          93 1.549 m (5' 1\") 2016 34.39 kg/m2         Blood Pressure from Last 3 Encounters:   17 119/79   04/10/17 123/80   17 115/76    Weight from Last 3 Encounters:   17 82.6 kg (182 lb)   04/10/17 82.6 kg (182 lb)   17 82.6 kg (182 lb)              Today, you had the following     No orders found for display       Primary Care Provider Office Phone # Fax #    Lilly Cartwright [phone]       [place] [address]        Thank you!     Thank you for choosing [facility]  for your care. Our goal is always to provide you with excellent care. Hearing back from our patients is one way we can continue to improve our services. Please take a few minutes to complete the written survey that you may receive in the mail after your visit with us. Thank you!             Your Updated Medication List - Protect others around you: Learn how to safely use, store and throw away your medicines at www.disposemymeds.org.          This list is accurate as of: 17  1:25 PM.  Always use your most recent med list.                   Brand Name Dispense Instructions for use    prenatal multivitamin  plus iron 27-0.8 MG Tabs per tablet     30 tablet    Take 1 tablet by mouth daily       vitamin D 2000 UNITS Caps     90 " capsule    Take 1 capsule by mouth daily Take one capsule daily.

## 2017-04-13 NOTE — LETTER
2017       RE: Tricia Tamez  [address]  [place] [address]     Dear Colleague,    Thank you for referring your patient, Tricia Tamez, to the [facility] at [facility]. Please see a copy of my visit note below.    41 year old,  , presents at 41 6/7 weeks in pregnancy complicated by post dates, AMA, asymmetric IUGR for biophysical assessment.    Fetal Breathing Movements (FBM): Normal - 2  Gross Body Movements (GBM): Normal - 2  Fetal Tone (FT): Normal - 2  AFV: Pocket of amniotic fluid > or = to 2 cm x 2 cm - 2  Quantitative Amniotic Fluid Volume Total: 11.2 cm      BPP 8/8.  FHR = 164bpm Normal.   MCA Not done.  NST Not done.     SIngle fetus in cephalic presentation.  Placenta anterior and grade 2.    Recommend induction of labor and if refused continue twice weekly assessment and growth ultrasound at next visit    ORIANA Wilcox MD

## 2017-04-17 ENCOUNTER — HOSPITAL ENCOUNTER (INPATIENT)
Facility: CLINIC | Age: 42
LOS: 1 days | Discharge: HOME | End: 2017-04-18
Attending: OBSTETRICS & GYNECOLOGY | Admitting: OBSTETRICS & GYNECOLOGY
Payer: MEDICAID

## 2017-04-17 ENCOUNTER — OFFICE VISIT (OUTPATIENT)
Dept: INTERPRETER SERVICES | Facility: CLINIC | Age: 42
End: 2017-04-17

## 2017-04-17 PROBLEM — Z36.89 ENCOUNTER FOR TRIAGE IN PREGNANT PATIENT: Status: ACTIVE | Noted: 2017-04-17

## 2017-04-17 PROBLEM — Z34.90 PREGNANCY: Status: ACTIVE | Noted: 2017-04-17

## 2017-04-17 LAB
ABO + RH BLD: NORMAL
ABO + RH BLD: NORMAL
ANISOCYTOSIS BLD QL SMEAR: SLIGHT
BASOPHILS # BLD AUTO: 0.2 10E9/L (ref 0–0.2)
BASOPHILS NFR BLD AUTO: 0.9 %
DIFFERENTIAL METHOD BLD: ABNORMAL
EOSINOPHIL # BLD AUTO: 0.2 10E9/L (ref 0–0.7)
EOSINOPHIL NFR BLD AUTO: 0.9 %
ERYTHROCYTE [DISTWIDTH] IN BLOOD BY AUTOMATED COUNT: 12.8 % (ref 10–15)
HCT VFR BLD AUTO: 37.4 % (ref 35–47)
HGB BLD-MCNC: 12.6 G/DL (ref 11.7–15.7)
LYMPHOCYTES # BLD AUTO: 3.8 10E9/L (ref 0.8–5.3)
LYMPHOCYTES NFR BLD AUTO: 20.9 %
MCH RBC QN AUTO: 28.7 PG (ref 26.5–33)
MCHC RBC AUTO-ENTMCNC: 33.7 G/DL (ref 31.5–36.5)
MCV RBC AUTO: 85 FL (ref 78–100)
MONOCYTES # BLD AUTO: 0.5 10E9/L (ref 0–1.3)
MONOCYTES NFR BLD AUTO: 2.6 %
NEUTROPHILS # BLD AUTO: 13.4 10E9/L (ref 1.6–8.3)
NEUTROPHILS NFR BLD AUTO: 74.7 %
PLATELET # BLD AUTO: 284 10E9/L (ref 150–450)
PLATELET # BLD EST: NORMAL 10*3/UL
RBC # BLD AUTO: 4.39 10E12/L (ref 3.8–5.2)
SPECIMEN EXP DATE BLD: NORMAL
T PALLIDUM IGG+IGM SER QL: NEGATIVE
WBC # BLD AUTO: 18 10E9/L (ref 4–11)

## 2017-04-17 PROCEDURE — 25000132 ZZH RX MED GY IP 250 OP 250 PS 637: Performed by: OBSTETRICS & GYNECOLOGY

## 2017-04-17 PROCEDURE — 0KQM0ZZ REPAIR PERINEUM MUSCLE, OPEN APPROACH: ICD-10-PCS | Performed by: OBSTETRICS & GYNECOLOGY

## 2017-04-17 PROCEDURE — 86780 TREPONEMA PALLIDUM: CPT | Performed by: OBSTETRICS & GYNECOLOGY

## 2017-04-17 PROCEDURE — 72200001 ZZH LABOR CARE VAGINAL DELIVERY SINGLE

## 2017-04-17 PROCEDURE — 88307 TISSUE EXAM BY PATHOLOGIST: CPT | Mod: 26 | Performed by: OBSTETRICS & GYNECOLOGY

## 2017-04-17 PROCEDURE — 25000132 ZZH RX MED GY IP 250 OP 250 PS 637

## 2017-04-17 PROCEDURE — 12000030 ZZH R&B OB INTERMEDIATE UMMC

## 2017-04-17 PROCEDURE — 36415 COLL VENOUS BLD VENIPUNCTURE: CPT | Performed by: OBSTETRICS & GYNECOLOGY

## 2017-04-17 PROCEDURE — T1013 SIGN LANG/ORAL INTERPRETER: HCPCS | Mod: U3

## 2017-04-17 PROCEDURE — 85025 COMPLETE CBC W/AUTO DIFF WBC: CPT | Performed by: OBSTETRICS & GYNECOLOGY

## 2017-04-17 PROCEDURE — 25800025 ZZH RX 258: Performed by: OBSTETRICS & GYNECOLOGY

## 2017-04-17 PROCEDURE — 99215 OFFICE O/P EST HI 40 MIN: CPT

## 2017-04-17 PROCEDURE — 25000125 ZZHC RX 250

## 2017-04-17 PROCEDURE — 86900 BLOOD TYPING SEROLOGIC ABO: CPT | Performed by: OBSTETRICS & GYNECOLOGY

## 2017-04-17 PROCEDURE — 25000125 ZZHC RX 250: Performed by: OBSTETRICS & GYNECOLOGY

## 2017-04-17 PROCEDURE — 88307 TISSUE EXAM BY PATHOLOGIST: CPT | Performed by: OBSTETRICS & GYNECOLOGY

## 2017-04-17 PROCEDURE — 10907ZC DRAINAGE OF AMNIOTIC FLUID, THERAPEUTIC FROM PRODUCTS OF CONCEPTION, VIA NATURAL OR ARTIFICIAL OPENING: ICD-10-PCS | Performed by: OBSTETRICS & GYNECOLOGY

## 2017-04-17 PROCEDURE — 86901 BLOOD TYPING SEROLOGIC RH(D): CPT | Performed by: OBSTETRICS & GYNECOLOGY

## 2017-04-17 PROCEDURE — 25000128 H RX IP 250 OP 636: Performed by: OBSTETRICS & GYNECOLOGY

## 2017-04-17 RX ORDER — OXYTOCIN 10 [USP'U]/ML
INJECTION, SOLUTION INTRAMUSCULAR; INTRAVENOUS
Status: DISCONTINUED
Start: 2017-04-17 | End: 2017-04-17 | Stop reason: HOSPADM

## 2017-04-17 RX ORDER — MISOPROSTOL 200 UG/1
400 TABLET ORAL
Status: DISCONTINUED | OUTPATIENT
Start: 2017-04-17 | End: 2017-04-18 | Stop reason: HOSPADM

## 2017-04-17 RX ORDER — HYDROCORTISONE 2.5 %
CREAM (GRAM) TOPICAL 3 TIMES DAILY PRN
Status: DISCONTINUED | OUTPATIENT
Start: 2017-04-17 | End: 2017-04-18 | Stop reason: HOSPADM

## 2017-04-17 RX ORDER — BISACODYL 10 MG
10 SUPPOSITORY, RECTAL RECTAL DAILY PRN
Status: DISCONTINUED | OUTPATIENT
Start: 2017-04-19 | End: 2017-04-18 | Stop reason: HOSPADM

## 2017-04-17 RX ORDER — OXYTOCIN/0.9 % SODIUM CHLORIDE 30/500 ML
340 PLASTIC BAG, INJECTION (ML) INTRAVENOUS CONTINUOUS PRN
Status: DISCONTINUED | OUTPATIENT
Start: 2017-04-17 | End: 2017-04-18 | Stop reason: HOSPADM

## 2017-04-17 RX ORDER — OXYTOCIN 10 [USP'U]/ML
10 INJECTION, SOLUTION INTRAMUSCULAR; INTRAVENOUS
Status: DISCONTINUED | OUTPATIENT
Start: 2017-04-17 | End: 2017-04-18 | Stop reason: HOSPADM

## 2017-04-17 RX ORDER — NALOXONE HYDROCHLORIDE 0.4 MG/ML
.1-.4 INJECTION, SOLUTION INTRAMUSCULAR; INTRAVENOUS; SUBCUTANEOUS
Status: DISCONTINUED | OUTPATIENT
Start: 2017-04-17 | End: 2017-04-17

## 2017-04-17 RX ORDER — ACETAMINOPHEN 325 MG/1
650 TABLET ORAL EVERY 4 HOURS PRN
Status: DISCONTINUED | OUTPATIENT
Start: 2017-04-17 | End: 2017-04-18 | Stop reason: HOSPADM

## 2017-04-17 RX ORDER — IBUPROFEN 400 MG/1
400-800 TABLET, FILM COATED ORAL EVERY 6 HOURS PRN
Status: DISCONTINUED | OUTPATIENT
Start: 2017-04-17 | End: 2017-04-18 | Stop reason: HOSPADM

## 2017-04-17 RX ORDER — NALOXONE HYDROCHLORIDE 0.4 MG/ML
.1-.4 INJECTION, SOLUTION INTRAMUSCULAR; INTRAVENOUS; SUBCUTANEOUS
Status: DISCONTINUED | OUTPATIENT
Start: 2017-04-17 | End: 2017-04-18 | Stop reason: HOSPADM

## 2017-04-17 RX ORDER — ACETAMINOPHEN 325 MG/1
650 TABLET ORAL EVERY 4 HOURS PRN
Status: DISCONTINUED | OUTPATIENT
Start: 2017-04-17 | End: 2017-04-17

## 2017-04-17 RX ORDER — AMOXICILLIN 250 MG
1-2 CAPSULE ORAL 2 TIMES DAILY
Status: DISCONTINUED | OUTPATIENT
Start: 2017-04-17 | End: 2017-04-18 | Stop reason: HOSPADM

## 2017-04-17 RX ORDER — SODIUM CHLORIDE, SODIUM LACTATE, POTASSIUM CHLORIDE, CALCIUM CHLORIDE 600; 310; 30; 20 MG/100ML; MG/100ML; MG/100ML; MG/100ML
INJECTION, SOLUTION INTRAVENOUS CONTINUOUS
Status: DISCONTINUED | OUTPATIENT
Start: 2017-04-17 | End: 2017-04-17

## 2017-04-17 RX ORDER — LANOLIN 100 %
OINTMENT (GRAM) TOPICAL
Status: DISCONTINUED | OUTPATIENT
Start: 2017-04-17 | End: 2017-04-18 | Stop reason: HOSPADM

## 2017-04-17 RX ORDER — OXYTOCIN/0.9 % SODIUM CHLORIDE 30/500 ML
PLASTIC BAG, INJECTION (ML) INTRAVENOUS
Status: DISCONTINUED
Start: 2017-04-17 | End: 2017-04-17 | Stop reason: HOSPADM

## 2017-04-17 RX ORDER — OXYTOCIN/0.9 % SODIUM CHLORIDE 30/500 ML
100-340 PLASTIC BAG, INJECTION (ML) INTRAVENOUS CONTINUOUS PRN
Status: COMPLETED | OUTPATIENT
Start: 2017-04-17 | End: 2017-04-17

## 2017-04-17 RX ORDER — FENTANYL CITRATE 50 UG/ML
50-100 INJECTION, SOLUTION INTRAMUSCULAR; INTRAVENOUS
Status: DISCONTINUED | OUTPATIENT
Start: 2017-04-17 | End: 2017-04-17

## 2017-04-17 RX ORDER — IBUPROFEN 800 MG/1
800 TABLET, FILM COATED ORAL
Status: DISCONTINUED | OUTPATIENT
Start: 2017-04-17 | End: 2017-04-17

## 2017-04-17 RX ORDER — ONDANSETRON 2 MG/ML
4 INJECTION INTRAMUSCULAR; INTRAVENOUS EVERY 6 HOURS PRN
Status: DISCONTINUED | OUTPATIENT
Start: 2017-04-17 | End: 2017-04-17

## 2017-04-17 RX ORDER — OXYCODONE AND ACETAMINOPHEN 5; 325 MG/1; MG/1
1 TABLET ORAL
Status: DISCONTINUED | OUTPATIENT
Start: 2017-04-17 | End: 2017-04-17

## 2017-04-17 RX ORDER — METHYLERGONOVINE MALEATE 0.2 MG/ML
200 INJECTION INTRAVENOUS
Status: DISCONTINUED | OUTPATIENT
Start: 2017-04-17 | End: 2017-04-18 | Stop reason: HOSPADM

## 2017-04-17 RX ORDER — METHYLERGONOVINE MALEATE 0.2 MG/ML
200 INJECTION INTRAVENOUS
Status: DISCONTINUED | OUTPATIENT
Start: 2017-04-17 | End: 2017-04-17

## 2017-04-17 RX ORDER — OXYTOCIN 10 [USP'U]/ML
10 INJECTION, SOLUTION INTRAMUSCULAR; INTRAVENOUS
Status: COMPLETED | OUTPATIENT
Start: 2017-04-17 | End: 2017-04-17

## 2017-04-17 RX ORDER — CARBOPROST TROMETHAMINE 250 UG/ML
250 INJECTION, SOLUTION INTRAMUSCULAR
Status: DISCONTINUED | OUTPATIENT
Start: 2017-04-17 | End: 2017-04-18 | Stop reason: HOSPADM

## 2017-04-17 RX ORDER — CARBOPROST TROMETHAMINE 250 UG/ML
250 INJECTION, SOLUTION INTRAMUSCULAR
Status: DISCONTINUED | OUTPATIENT
Start: 2017-04-17 | End: 2017-04-17

## 2017-04-17 RX ORDER — OXYTOCIN/0.9 % SODIUM CHLORIDE 30/500 ML
100 PLASTIC BAG, INJECTION (ML) INTRAVENOUS CONTINUOUS
Status: DISCONTINUED | OUTPATIENT
Start: 2017-04-17 | End: 2017-04-18 | Stop reason: HOSPADM

## 2017-04-17 RX ADMIN — LIDOCAINE HYDROCHLORIDE 20 ML: 10 INJECTION, SOLUTION EPIDURAL; INFILTRATION; INTRACAUDAL; PERINEURAL at 02:57

## 2017-04-17 RX ADMIN — OXYTOCIN 10 UNITS: 10 INJECTION INTRAVENOUS at 02:58

## 2017-04-17 RX ADMIN — SODIUM CHLORIDE, POTASSIUM CHLORIDE, SODIUM LACTATE AND CALCIUM CHLORIDE 500 ML: 600; 310; 30; 20 INJECTION, SOLUTION INTRAVENOUS at 02:35

## 2017-04-17 RX ADMIN — MISOPROSTOL 800 MCG: 200 TABLET ORAL at 02:58

## 2017-04-17 RX ADMIN — SENNOSIDES AND DOCUSATE SODIUM 2 TABLET: 8.6; 5 TABLET ORAL at 19:42

## 2017-04-17 RX ADMIN — OXYTOCIN-SODIUM CHLORIDE 0.9% IV SOLN 30 UNIT/500ML 340 ML/HR: 30-0.9/5 SOLUTION at 02:50

## 2017-04-17 NOTE — L&D DELIVERY NOTE
Tricia Tamez is a 41 year old  at 42w3d who presented in active labor. Patient progressed spontaneously to complete and began pushing. She precipitously delivered a vigorous baby boy in LIA position with APGARs 7 & 9. Weight is pending. Given Cat II tracing prior to delivery NICU was present. Placenta delivered spontaneously and appeared intact. IV infiltrated and thus IM pitocin was given in addition to rectal cytotec Patient examined and noted to have second degree laceration that was repaired in usual fashion with 3-0 vicryl. .    Shwetha Paul PGY2  2017 3:21 AM   Delivery Summary    Tricia Tamez MRN# [id number]   Age: 41 year old YOB: 1975             Labor Events     labor?:  No    steroids:  None   Labor Type:  Spontaneous      Antibiotics received during labor?:  No      Rupture date/time:     Rupture type:  Artificial Rupture of Membranes   Fluid color:  Clear   Fluid odor:  Normal      Delivery/Placenta Date and Time    Delivery Date:  17 Delivery Time:   2:50 AM   Oxytocin given at the time of delivery:  after delivery of baby      Apgars    Living status:  Yes    1 Minute 5 Minute 10 Minute 15 Minute 20 Minute   Skin color: 0  1       Heart rate: 2  2       Reflex irritability: 2  2       Muscle tone: 1  2       Respiratory effort: 2  2       Total: 7  9          Apgars assigned by:  ELINA BLAND,NNP-BC      Cord    Vessels:  3 Vessels Complications:  None          Resuscitation    Methods:  None      [place] Care at Delivery:  NICU team called to a post-term, 42 week GA delivery for fetal heart rate decelerations.  Infant presented with a strong lusty cry and good respiratory effort.  Infant placed on mother's chest, continued to cry, was centrally pink, had good tone and good respiratory effort.  Infant left in the care of the labor and delivery RN.  NICU team dismissed.    ELINA Bowers, CNP  2017 3:01 AM         Skin to Skin and Feeding Plan    Skin to skin initiation date/time:     Skin to skin with:  Mother   Skin to skin end date/time:        Labor Events and Shoulder Dystocia    Fetal Tracing Prior to Delivery:  Category 2   Shoulder dystocia present?:  Neg            Delivery (Maternal) (Provider to Complete) ([id number])    Episiotomy:  None   Perineal lacerations:  2nd Repaired?:  Yes   Vaginal laceration?:  No    Cervical laceration?:  No          Mother's Information  Mother: Tricia Tamez #[id number]    Start of Mother's Information     IO Blood Loss  04/16/17 1450 - 04/17/17 0321    Mom's I/O Activity            End of Mother's Information  Mother: Tricia Tamez #[id number]                       Shwetha Paul MD      Staff:  I was scrubbed and present for entire case and agree w/ above note.    Marisa Shabazz MD

## 2017-04-17 NOTE — IP AVS SNAPSHOT
[facility]    [address] [facility] [address]    Phone:  [phone]                                       After Visit Summary   4/17/2017    Tricia Tamez    MRN: [id number]           After Visit Summary Signature Page     I have received my discharge instructions, and my questions have been answered. I have discussed any challenges I see with this plan with the nurse or doctor.    ..........................................................................................................................................  Patient/Patient Representative Signature      ..........................................................................................................................................  Patient Representative Print Name and Relationship to Patient    ..................................................               ................................................  Date                                            Time    ..........................................................................................................................................  Reviewed by Signature/Title    ...................................................              ..............................................  Date                                                            Time

## 2017-04-17 NOTE — H&P
L&D History and Physical   2017  Tricia Tamez  [id number]    Admission Date: 2017   PCP: Lilly Cartwright     HPI: Tricia Tamez is a 41 year old  at 42w3d who presents in labor. Patient states that contractions began around 1700. Feels that water broke around 1900. Feeling baby move. Scant vaginal bleeding. Otherwise doing well. No headache, vision changes, chest pain, SOB.    History limited given language barrier.     Pregnancy notable for:  History of Preeclampsia  Grand multiparity  AMA  Failed GCT, Passed GTT  Latent TB- T spot+ on 10/2013 treated for 2 months only  Poverty  Post Dates  IUGR    OBHX:   Obstetric History       T7      TAB0   SAB0   E0   M0   L6       # Outcome Date GA Lbr Michael/2nd Weight Sex Delivery Anes PTL Lv   9 Current            8 Term 05/14/15 38w3d  2.268 kg (5 lb) F Vag-Spont None  Y      Apgar1:  9                Apgar5: 9   7 AB      AB, COMPLETE      6 Term         ND   5 Term         Y   4 Term         Y   3 Term         Y   2 Term         Y   1 Term         Y          MedicalHX:  Denies    SurgicalHX:   Denies    Medications:   PNV  Vitamin D    Allergies:   NKDA    FamilyHX: No family history on file.    SocialHX: No alcohol, drug or tobacco use.    ROS: 10-point ROS negative except as in HPI     Physical Exam:  Vitals:    17 0130   BP: 132/90   Resp: 18   Temp: 98.8  F (37.1  C)   TempSrc: Oral     GEN: resting comfortably in bed, NAD   CV: RRR, no murmurs  PULM: CTAB, no increased work of breathing, no cough/wheeze   ABD: soft, gravid, non-tender, non-distended  EXT: trace edema, non tender to palpation  CVX: /0  Presentation: vtx by US  EFW: 7.5 lbs    NST:  FHT: baseline 150, minimal to moderate variability, + accels, variable decels  TOCO: q3-4 min       Lab Results   Component Value Date    ABO AB 2016    RH Pos 2016    AS Neg 2016    AS Neg 2016    HGB 11.7 05/15/2015       GBS Status: Neg  Pap NILM    A/P: Tricia  Joi is a 41 year old  at 42w3d who presents in labor.  At the time of this note the patient is already delivered. She progressed quickly to complete and began pushing with precipitous descent and delivery. Please see delivery note for details.     Shwetha Paul   2017 2:20 AM       [facility] staff:  Appreciate note by Dr. Paul.  I have seen and examined the patient without the resident. I have reviewed, edited, and agree with the note.        Marisa Shabazz MD, FACOG  2017  3:17 AM

## 2017-04-17 NOTE — PROGRESS NOTES
S:  Doing well so far since arriving on the postpartum unit-tired but no concerns.    O: BP 93/72  Temp 98.6  F (37  C) (Oral)  Resp 18  LMP 2016  Breastfeeding? Unknown  gen-resting, NAD  abd-soft, FF, NT  extr-NT, tr edema fly    hgb in am    A:  Day of delivery s/p uncomplicated .    P:  Routine postpartum care.    Nora Del Toro MD, FACOG

## 2017-04-17 NOTE — IP AVS SNAPSHOT
MRN:[id number]                      After Visit Summary   4/17/2017    Tricia Tamez    MRN: [id number]           Thank you!     Thank you for choosing [facility] for your care. Our goal is always to provide you with excellent care. Hearing back from our patients is one way we can continue to improve our services. Please take a few minutes to complete the written survey that you may receive in the mail after you visit with us. Thank you!        Patient Information     Date Of Birth          1975        Designated Caregiver       Most Recent Value    Caregiver    Will someone help with your care after discharge? no [pt will not need help with care of herself. ]      About your hospital stay     You were admitted on:  April 17, 2017 You last received care in the:  [facility]    You were discharged on:  April 18, 2017       Who to Call     For medical emergencies, please call 911.  For non-urgent questions about your medical care, please call your primary care provider or clinic, [phone]          Attending Provider     Provider Specialty    Marisa Shabazz MD OB/Gyn       Primary Care Provider Office Phone # Fax Malik Cartwright [phone]       [facility] [address] [facility] [address]        After Care Instructions     Activity       Review discharge instructions            Diet       Resume previous diet            Discharge Instructions - Postpartum visit       Schedule postpartum visit with your provider and return to clinic in 6 weeks.                  Your next 10 appointments already scheduled     Apr 19, 2017  8:30 AM CDT   Deport Inpatient with Roslyn Corona    Services Department ([facility])    [address] [facility] [address] [phone]              Further instructions from your care team       Postpartum Vaginal Delivery Instructions    Activity       Ask family and  friends for help when you need it.    Do not place anything in your vagina for 6 weeks.    You are not restricted on other activities, but take it easy for a few weeks to allow your body to recover from delivery.  You are able to do any activities you feel up to that point.    No driving until you have stopped taking your pain medications (usually two weeks after delivery).     Call your health care provider if you have any of these symptoms:       Increased pain, swelling, redness, or fluid around your stiches from an episiotomy or perineal tear.    A fever above 100.4 F (38 C) with or without chills when placing a thermometer under your tongue.    You soak a sanitary pad with blood within 1 hour, or you see blood clots larger than a golf ball.    Bleeding that lasts more than 6 weeks.    Vaginal discharge that smells bad.    Severe pain, cramping or tenderness in your lower belly area.    A need to urinate more frequently (use the toilet more often), more urgently (use the toilet very quickly), or it burns when you urinate.    Nausea and vomiting.    Redness, swelling or pain around a vein in your leg.    Problems breastfeeding or a red or painful area on your breast.    Chest pain and cough or are gasping for air.    Problems coping with sadness, anxiety, or depression.  If you have any concerns about hurting yourself or the baby, call your provider immediately.     You have questions or concerns after you return home.     Keep your hands clean:  Always wash your hands before touching your perineal area and stitches.  This helps reduce your risk of infection.  If your hands aren't dirty, you may use an alcohol hand-rub to clean your hands. Keep your nails clean and short.        Pending Results     Date and Time Order Name Status Description    4/17/2017 0434 Placenta path order and indications In process             Admission Information     Date & Time Provider Department Dept. Phone    4/17/2017 Mele  "Marisa Arshad MD [facility] [phone]      Your Vitals Were     Blood Pressure Pulse Temperature Respirations Last Period       126/75 88 97.9  F (36.6  C) (Oral) 17 2016       [employer] Information     [employer] lets you send messages to your doctor, view your test results, renew your prescriptions, schedule appointments and more. To sign up, go to [email or web address] . Click on \"Log in\" on the left side of the screen, which will take you to the Welcome page. Then click on \"Sign up Now\" on the right side of the page.     You will be asked to enter the access code listed below, as well as some personal information. Please follow the directions to create your username and password.     Your access code is: [email or web address]  Expires: 2017 10:00 AM     Your access code will  in 90 days. If you need help or a new code, please call your [place] clinic or [phone].        Care EveryWhere ID     This is your Care EveryWhere ID. This could be used by other organizations to access your [place] medical records  [id number]           Review of your medicines      CONTINUE these medicines which have NOT CHANGED        Dose / Directions    prenatal multivitamin  plus iron 27-0.8 MG Tabs per tablet   Used for:  HRP (high risk pregnancy), third trimester        Dose:  1 tablet   Take 1 tablet by mouth daily   Quantity:  30 tablet   Refills:  3       vitamin D 2000 UNITS Caps   Used for:  Hypovitaminosis D        Dose:  1 capsule   Take 1 capsule by mouth daily Take one capsule daily.   Quantity:  90 capsule   Refills:  3                Protect others around you: Learn how to safely use, store and throw away your medicines at www.disposemymeds.org.             Medication List: This is a list of all your medications and when to take them. Check marks below indicate your daily home schedule. Keep this list as a reference.      Medications           Morning Afternoon Evening Bedtime As Needed    prenatal " multivitamin  plus iron 27-0.8 MG Tabs per tablet   Take 1 tablet by mouth daily                                vitamin D 2000 UNITS Caps   Take 1 capsule by mouth daily Take one capsule daily.

## 2017-04-18 VITALS
DIASTOLIC BLOOD PRESSURE: 75 MMHG | HEART RATE: 88 BPM | SYSTOLIC BLOOD PRESSURE: 126 MMHG | RESPIRATION RATE: 17 BRPM | TEMPERATURE: 97.9 F

## 2017-04-18 LAB
COPATH REPORT: NORMAL
HGB BLD-MCNC: 11.7 G/DL (ref 11.7–15.7)

## 2017-04-18 PROCEDURE — T1013 SIGN LANG/ORAL INTERPRETER: HCPCS | Mod: U3

## 2017-04-18 PROCEDURE — 36415 COLL VENOUS BLD VENIPUNCTURE: CPT | Performed by: OBSTETRICS & GYNECOLOGY

## 2017-04-18 PROCEDURE — 85018 HEMOGLOBIN: CPT | Performed by: OBSTETRICS & GYNECOLOGY

## 2017-04-18 NOTE — DOWNTIME EVENT NOTE
The EMR was down for 10 hours on 4/17-18/2017.    Cecy and Jan were responsible for completing the paper charting during this time period.     The following information was re-entered into the system by NIKHIL THORPE: Flowsheet data    The following information will remain in the paper chart: Flowsheets, education.     NIKHIL THORPE  4/18/2017

## 2017-04-18 NOTE — DISCHARGE SUMMARY
[facility] Discharge Summary    Tricia Tamez MRN# [id number]   Age: 41 year old YOB: 1975     Date of Admission:  2017  Date of Discharge::  17  Admitting Physician:  Marisa Shabazz MD  Discharge Physician:  Alana Beckford MD          Admission Diagnoses:   - IUP at 42w3d  - History of Preeclampsia  - Grand multiparity  - AMA  - Failed GCT, Passed GTT  - Latent TB- T spot+ on 10/2013, inadequately treated for 2 months  - Post Dates Pregnancy   - IUGR          Discharge Diagnosis:   -IUP at 42w3d, now delivered  -Same as above          Procedures:   Procedure(s): -          Medications Prior to Admission:     Prescriptions Prior to Admission   Medication Sig Dispense Refill Last Dose     Cholecalciferol (VITAMIN D) 2000 UNITS CAPS Take 1 capsule by mouth daily Take one capsule daily. 90 capsule 3 2017 at Unknown time     Prenatal Vit-Fe Fumarate-FA (PRENATAL MULTIVITAMIN  PLUS IRON) 27-0.8 MG TABS Take 1 tablet by mouth daily 30 tablet 3 2017 at Unknown time          Discharge Medications:        Review of your medicines      CONTINUE these medicines which have NOT CHANGED       Dose / Directions    prenatal multivitamin  plus iron 27-0.8 MG Tabs per tablet   Used for:  HRP (high risk pregnancy), third trimester        Dose:  1 tablet   Take 1 tablet by mouth daily   Quantity:  30 tablet   Refills:  3       vitamin D 2000 UNITS Caps   Used for:  Hypovitaminosis D        Dose:  1 capsule   Take 1 capsule by mouth daily Take one capsule daily.   Quantity:  90 capsule   Refills:  3                Consultations:   None          Brief Admission History   Tricia Tamez is a 41 year old  at 42w3d who presented in labor. Patient states that contractions began around 1700. Feels that water broke around 1900. Feeling baby move. Scant vaginal bleeding. Otherwise doing well. No headache, vision changes, chest pain, SOB.          Brief Intrapartum Course:   Tricia Tamez is a 41  year old  at 42w3d who presented in active labor. Patient progressed spontaneously to complete and began pushing. She precipitously delivered a vigorous baby boy in LIA position with APGARs 7 & 9. Weight is pending. Given Cat II tracing prior to delivery NICU was present. Placenta delivered spontaneously and appeared intact. IV infiltrated and thus IM pitocin was given in addition to rectal cytotec Patient examined and noted to have second degree laceration that was repaired in usual fashion with 3-0 vicryl. .           Hospital Course:   The patient's hospital course was unremarkable.  On discharge, her pain was well controlled. Vaginal bleeding is similar to menstrual flow.  Voiding without difficulty.  Ambulating well and tolerating a normal diet.  No fever.  Breastfeeding well.  Infant is stable. She was discharged on post-partum day #1.    Post-partum hemoglobin: 11.7  Contraception: Unsure, options discussed           Discharge Instructions and Follow-Up:   Discharge diet: Regular   Discharge activity: Pelvic rest for 6 weeks including no sexual intercourse, tampons, or douching.   Discharge follow-up: Follow up with your primary OB for a routine postpartum visit in 6 weeks  Follow up with ID for treatment of latent TB           Discharge Disposition:   Discharged to home      Donna Feliciano MD  Obstetrics and Gynecology, PGY-1           OB/GYN Staff -- Pt seen and examined by me. Agree with note as above.  Mamie

## 2017-04-18 NOTE — PROGRESS NOTES
"[facility]  MATERNAL CHILD HEALTH   SOCIAL WORK PROGRESS NOTE    DATA:     SW consulted via RN order due to \"patient's prenatal record has 'extreme poverty' listed on her problem list.\" SW spoke with pt's RN who was unaware of SW needs at this time. RN told SW that pt is being discharged this morning and has social support bedside during her admission. SW attempted to visit pt this morning but was unsuccessful meeting pt bedside.    INTERVENTION:     SW consulted with the RN who was unaware of SW needs prior to discharge. RN reports plan for pt to discharge this morning. SW unsuccessful in meeting pt bedside.    ASSESSMENT:     No indication for psychosocial assessment; pt eager to discharge. No SW concerns identified.    PLAN:     Plan for discharge. Anticipate no further SW involvement at this time. Re-consult for SW to follow if needs arise.    WOODY Hernandez, [facility]  Clinical   Maternal Child Health  [facility]  Phone:   [phone]  Pager:    [phone]           "

## 2017-04-18 NOTE — PROGRESS NOTES
Postpartum Progress Note  Tricia Tamez  [id number]    Subjective:   Patient is feeling well.  Lochia minimal. menses.  Eating and drinking without nausea/emesis.  Ambulating without difficulty, lightheadedness.  Pain is adequately controlled Breastfeeding without concerns/questions.  Voiding without difficulty.    Objective:  /75  Pulse 96  Temp 98.1  F (36.7  C) (Oral)  Resp 18  LMP 2016  Breastfeeding? Unknown    I/O last 3 completed shifts:  In: -   Out: 112 [Blood:112]    General: lying in bed, NAD, comfortable  Heart/lungs: no increased work of breathing, well perfused  Abdomen: Soft, non-tender, non-distended; fundus firm and below umbilicus  Extremities: 2+ edema in BLE    Labs:  Hemoglobin   Date Value Ref Range Status   2017 11.7 11.7 - 15.7 g/dL Final     Hemoglobin   Date Value Ref Range Status   2017 11.7 11.7 - 15.7 g/dL Final   2017 12.6 11.7 - 15.7 g/dL Final       Assessment/Plan: 41 year old  who is PPD#1 s/p .  Currently stable and doing well.    - Routine post-partum cares.   Ambulating, voiding without difficulty.  Tolerating regular diet.  - Heme:    Hgb 12.6> > pending  - Baby:     In room doing well  - Contraception:   unsure  - Rh +  - Rubella immune    Dispo: d/c to home likely later today    Shwetha Paul PGY2  2017 6:44 AM

## 2017-04-18 NOTE — DISCHARGE SUMMARY
Patient stable independent with infant and self care. Discharged instructions reviewed. Pt verbalized understanding of discharge instructions. D/C home with infant. Checked and matched bands with infant. Instructed follow up in 6 weeks in clinic.

## 2017-04-22 ENCOUNTER — DOCUMENTATION ONLY (OUTPATIENT)
Dept: CARE COORDINATION | Facility: CLINIC | Age: 42
End: 2017-04-22

## 2017-04-22 NOTE — PROGRESS NOTES
[facility] now requests orders and shares plan of care/discharge summaries for some patients through [employer]. Thank you for your assistance in improving collaboration for our patients.    [facility] has made 2 attempts to contact patient by phone and text message over the last 4 days to offer a  home visit. We have not had any response from patient.  Final message was left advising patient to follow up with Primary Care Providers for mom and baby.      Thank you for the referral.  Sincerely, [facility] [phone]

## 2018-09-14 LAB
25 OH VIT D TOTAL: 7
25 OH VIT D3: ABNORMAL
ANTIBODY IDENT: NORMAL
BLD GP AB SCN SERPL QL: NEGATIVE
C TRACH DNA SPEC QL PROBE+SIG AMP: NORMAL
CULTURE MICRO: NEGATIVE
GLU, 1 HOUR, 100 G: 181
GLU, 2 HOUR, 100 G: 109
GLU, 3 HOUR, 100 G: 49
HBV SURFACE AG SERPL QL IA: NON REACTIVE
HCT VFR BLD AUTO: 36.2 %
HEMOGLOBIN: 11.9 G/DL (ref 11.7–15.7)
HIV 1+2 AB+HIV1 P24 AG SERPL QL IA: NEGATIVE
N GONORRHOEA DNA SPEC QL PROBE+SIG AMP: NORMAL
VITAMIN D2 SERPL-MCNC: ABNORMAL PG/ML

## 2018-09-19 ENCOUNTER — PRE VISIT (OUTPATIENT)
Dept: MATERNAL FETAL MEDICINE | Facility: CLINIC | Age: 43
End: 2018-09-19

## 2018-09-26 ENCOUNTER — OFFICE VISIT (OUTPATIENT)
Dept: MATERNAL FETAL MEDICINE | Facility: CLINIC | Age: 43
End: 2018-09-26
Attending: ADVANCED PRACTICE MIDWIFE
Payer: COMMERCIAL

## 2018-09-26 ENCOUNTER — HOSPITAL ENCOUNTER (OUTPATIENT)
Dept: ULTRASOUND IMAGING | Facility: CLINIC | Age: 43
Discharge: HOME | End: 2018-09-26
Attending: ADVANCED PRACTICE MIDWIFE | Admitting: ADVANCED PRACTICE MIDWIFE
Payer: COMMERCIAL

## 2018-09-26 DIAGNOSIS — O09.33 LATE PRENATAL CARE AFFECTING PREGNANCY IN THIRD TRIMESTER: ICD-10-CM

## 2018-09-26 DIAGNOSIS — O26.90 PREGNANCY RELATED CONDITION, ANTEPARTUM: ICD-10-CM

## 2018-09-26 DIAGNOSIS — O09.523 ELDERLY MULTIGRAVIDA IN THIRD TRIMESTER: Primary | ICD-10-CM

## 2018-09-26 PROCEDURE — 76811 OB US DETAILED SNGL FETUS: CPT

## 2018-09-26 NOTE — PROGRESS NOTES
"Please see \"Imaging\" tab under \"Chart Review\" for details of today's US at the [facility].    Mik Cartagena MD  Maternal-Fetal Medicine      "

## 2018-09-26 NOTE — MR AVS SNAPSHOT
After Visit Summary   9/26/2018    Tricia Tamez    MRN: [id number]           Patient Information     Date Of Birth          1975        Visit Information        Provider Department      9/26/2018 11:30 AM Mik Cartagena MD [facility] Maternal Fetal Medicine [facility]        Today's Diagnoses     Elderly multigravida in third trimester    -  1    Late prenatal care affecting pregnancy in third trimester           Follow-ups after your visit        Your next 10 appointments already scheduled     Oct 19, 2018 11:45 AM CDT   MFM US COMPRE SINGLE F/U with [id number]   [facility] Maternal Fetal Medicine Ultrasound - [facility])    [address]  [facility] [address] [phone]           Wear comfortable clothes and leave your valuables at home.            Oct 19, 2018 12:15 PM CDT   Radiology MD with UR DANILO LIRA   [facility] Maternal Fetal Medicine - [facility])    [address]  [facility] [address]   [phone]           Please arrive at the time given for your first appointment. This visit is used internally to schedule the physician's time during your ultrasound.              Future tests that were ordered for you today     Open Future Orders        Priority Expected Expires Ordered    MFM US Comprehensive Single F/U Routine 10/17/2018 9/26/2019 9/26/2018            Who to contact     If you have questions or need follow up information about today's clinic visit or your schedule please contact [facility] MATERNAL FETAL MEDICINE [facility] directly at [phone].  Normal or non-critical lab and imaging results will be communicated to you by MyChart, letter or phone within 4 business days after the clinic has received the results. If you do not hear from us within 7 days, please contact the clinic through MyChart or phone. If you have a critical or abnormal lab result, we will notify you by  "phone as soon as possible.  Submit refill requests through [employer] or call your pharmacy and they will forward the refill request to us. Please allow 3 business days for your refill to be completed.          Additional Information About Your Visit        [employer] Information     [employer] lets you send messages to your doctor, view your test results, renew your prescriptions, schedule appointments and more. To sign up, go to [email or web address] . Click on \"Log in\" on the left side of the screen, which will take you to the Welcome page. Then click on \"Sign up Now\" on the right side of the page.     You will be asked to enter the access code listed below, as well as some personal information. Please follow the directions to create your username and password.     Your access code is: [email or web address]  Expires: 2018 12:10 PM     Your access code will  in 90 days. If you need help or a new code, please call your [place] clinic or [phone].        Care EveryWhere ID     This is your Care EveryWhere ID. This could be used by other organizations to access your [place] medical records  [id number]        Your Vitals Were     Last Period                   (LMP Unknown)            Blood Pressure from Last 3 Encounters:   17 126/75   17 119/79   04/10/17 123/80    Weight from Last 3 Encounters:   17 82.6 kg (182 lb)   04/10/17 82.6 kg (182 lb)   17 82.6 kg (182 lb)               Primary Care Provider Office Phone # Fax #    Lilly Cartwright [phone]       [facility] [address]  [facility] [address]        Equal Access to Services     ISAIAS GOMEZ : Hadii maryann Wilburn, jose eduardo mejia, qaabrahamta jessalmahubert irvin. So [facility] [phone].    ATENCIÓN: Si habla español, tiene a lozada disposición servicios gratuitos de asistencia lingüística. Llame al [phone].    We comply with applicable federal civil rights laws " and [place] laws. We do not discriminate on the basis of race, color, national origin, age, disability, sex, sexual orientation, or gender identity.            Thank you!     Thank you for choosing [facility]  for your care. Our goal is always to provide you with excellent care. Hearing back from our patients is one way we can continue to improve our services. Please take a few minutes to complete the written survey that you may receive in the mail after your visit with us. Thank you!             Your Updated Medication List - Protect others around you: Learn how to safely use, store and throw away your medicines at www.disposemymeds.org.          This list is accurate as of 9/26/18 12:10 PM.  Always use your most recent med list.                   Brand Name Dispense Instructions for use Diagnosis    prenatal multivitamin plus iron 27-0.8 MG Tabs per tablet     30 tablet    Take 1 tablet by mouth daily    HRP (high risk pregnancy), third trimester       vitamin D 2000 units Caps     90 capsule    Take 1 capsule by mouth daily Take one capsule daily.    Hypovitaminosis D

## 2018-10-19 ENCOUNTER — OFFICE VISIT (OUTPATIENT)
Dept: MATERNAL FETAL MEDICINE | Facility: CLINIC | Age: 43
End: 2018-10-19
Attending: OBSTETRICS & GYNECOLOGY
Payer: COMMERCIAL

## 2018-10-19 ENCOUNTER — HOSPITAL ENCOUNTER (OUTPATIENT)
Dept: ULTRASOUND IMAGING | Facility: CLINIC | Age: 43
Discharge: HOME | End: 2018-10-19
Attending: OBSTETRICS & GYNECOLOGY | Admitting: OBSTETRICS & GYNECOLOGY
Payer: COMMERCIAL

## 2018-10-19 DIAGNOSIS — O09.523 ELDERLY MULTIGRAVIDA IN THIRD TRIMESTER: ICD-10-CM

## 2018-10-19 DIAGNOSIS — O09.293 IUGR (INTRAUTERINE GROWTH RETARDATION) IN PRIOR PREGNANCY, PREGNANT, THIRD TRIMESTER: Primary | ICD-10-CM

## 2018-10-19 PROCEDURE — 76816 OB US FOLLOW-UP PER FETUS: CPT

## 2018-10-19 NOTE — PROGRESS NOTES
"Please see \"Imaging\" tab under \"Chart Review\" for details of today's US at the [facility].    Mik Cartagena MD  Maternal-Fetal Medicine      "

## 2018-11-09 ENCOUNTER — OFFICE VISIT (OUTPATIENT)
Dept: MATERNAL FETAL MEDICINE | Facility: CLINIC | Age: 43
End: 2018-11-09
Attending: OBSTETRICS & GYNECOLOGY
Payer: COMMERCIAL

## 2018-11-09 ENCOUNTER — HOSPITAL ENCOUNTER (OUTPATIENT)
Dept: ULTRASOUND IMAGING | Facility: CLINIC | Age: 43
Discharge: HOME | End: 2018-11-09
Attending: OBSTETRICS & GYNECOLOGY | Admitting: OBSTETRICS & GYNECOLOGY
Payer: COMMERCIAL

## 2018-11-09 DIAGNOSIS — O09.523 ELDERLY MULTIGRAVIDA IN THIRD TRIMESTER: ICD-10-CM

## 2018-11-09 DIAGNOSIS — O09.523 ADVANCED MATERNAL AGE IN MULTIGRAVIDA, THIRD TRIMESTER: Primary | ICD-10-CM

## 2018-11-09 PROCEDURE — 76816 OB US FOLLOW-UP PER FETUS: CPT

## 2018-11-09 PROCEDURE — 76819 FETAL BIOPHYS PROFIL W/O NST: CPT | Performed by: OBSTETRICS & GYNECOLOGY

## 2018-11-09 NOTE — PROGRESS NOTES
Please see the imaging tab for details of the ultrasound performed today.    Shonda Chong MD  Specialist in Maternal-Fetal Medicine

## 2018-11-09 NOTE — MR AVS SNAPSHOT
MRN:[id number]                      After Visit Summary   11/9/2018    Tricia Tamez    MRN: [id number]           Visit Information        Provider Department      11/9/2018 2:00 PM Shonda Chong MD [facility] [place]        Your next 10 appointments already scheduled     Nov 09, 2018  3:00 PM CST   Nurse Visit with [facility] Nurse   [facility] ([facility])    [place] [facility] [address]  [address]  [facility] [address]   [phone]            Nov 09, 2018  3:30 PM CST   NEW OB with Umm Starks MD   [facility] ([facility])    [place] [facility] [address]  [address]  [facility] [address]   [phone]            Nov 16, 2018  9:30 AM CST   DANILO BPP SINGLE with [email or web address]   [facility] [place] ([facility])    [address]  [facility] [address]   [phone]            Nov 16, 2018 10:00 AM CST   Radiology MD with INOCENCIO CARRASCO MD   [facility] [place] ([facility])    [address]  [facility] [address]   [phone]           Please arrive at the time given for your first appointment. This visit is used internally to schedule the physician's time during your ultrasound.            Nov 23, 2018 11:00 AM CST   DANILO BPP SINGLE with URMFMUSR1   [facility] [place] ([facility])    [address]  [facility] [address]   [phone]            Nov 23, 2018 11:30 AM CST   Radiology MD with INOCENCIO CARRASCO MD   [facility] [place] ([facility])    [address]  [facility] [address]   [phone]           Please arrive at the time given for your first appointment. This  "visit is used internally to schedule the physician's time during your ultrasound.            2018 11:45 AM CST   DANILO BPP SINGLE with URMFMUSR2   [facility] - [facility])    [address]  [facility] [address]   [phone]            2018 12:15 PM CST   Radiology MD with UR DANILO LIRA   [facility] - [facility])    [address]  [facility] [address]   [phone]           Please arrive at the time given for your first appointment. This visit is used internally to schedule the physician's time during your ultrasound.              [employer] Information     Naked lets you send messages to your doctor, view your test results, renew your prescriptions, schedule appointments and more. To sign up, go to [email or web address] . Click on \"Log in\" on the left side of the screen, which will take you to the Welcome page. Then click on \"Sign up Now\" on the right side of the page.     You will be asked to enter the access code listed below, as well as some personal information. Please follow the directions to create your username and password.     Your access code is: [email or web address]  Expires: 2018 11:10 AM     Your access code will  in 90 days. If you need help or a new code, please call your [place] clinic or [phone].        Care EveryWhere ID     This is your Care EveryWhere ID. This could be used by other organizations to access your [place] medical records  [id number]        Equal Access to Services     [facility] PATRICIA : Hadradhika Wilburn, waaxda roberto, qahernando mercadoalhubert zuniga. So [facility] [phone].    ATENCIÓN: Si habla español, tiene a lozada disposición servicios gratuitos de asistencia lingüística. Llame al [phone].    We comply with applicable federal civil rights " laws and [place] laws. We do not discriminate on the basis of race, color, national origin, age, disability, sex, sexual orientation, or gender identity.

## 2018-11-16 ENCOUNTER — HOSPITAL ENCOUNTER (OUTPATIENT)
Dept: ULTRASOUND IMAGING | Facility: CLINIC | Age: 43
Discharge: HOME | End: 2018-11-16
Attending: OBSTETRICS & GYNECOLOGY | Admitting: OBSTETRICS & GYNECOLOGY
Payer: COMMERCIAL

## 2018-11-16 ENCOUNTER — OFFICE VISIT (OUTPATIENT)
Dept: MATERNAL FETAL MEDICINE | Facility: CLINIC | Age: 43
End: 2018-11-16
Attending: OBSTETRICS & GYNECOLOGY
Payer: COMMERCIAL

## 2018-11-16 DIAGNOSIS — O09.523 ELDERLY MULTIGRAVIDA IN THIRD TRIMESTER: ICD-10-CM

## 2018-11-16 DIAGNOSIS — O09.523 ADVANCED MATERNAL AGE IN MULTIGRAVIDA, THIRD TRIMESTER: Primary | ICD-10-CM

## 2018-11-16 PROCEDURE — 76819 FETAL BIOPHYS PROFIL W/O NST: CPT

## 2018-11-16 NOTE — PROGRESS NOTES
"Please see \"Imaging\" tab under Chart Review for full details.    BPP is 8/8.    Lizbeth Cannon MD  Maternal Fetal Medicine    "

## 2018-11-16 NOTE — MR AVS SNAPSHOT
After Visit Summary   11/16/2018    Tricia Tamez    MRN: [id number]           Patient Information     Date Of Birth          1975        Visit Information        Provider Department      11/16/2018 10:00 AM Lizbeth Cannon MD [facility] [place]        Today's Diagnoses     Advanced maternal age in multigravida, third trimester    -  1       Follow-ups after your visit        Your next 10 appointments already scheduled     Nov 16, 2018 10:00 AM CST   Radiology MD with Lizbeth Cannon MD   [facility] - [facility])    [address]  [facility] [address]   [phone]           Please arrive at the time given for your first appointment. This visit is used internally to schedule the physician's time during your ultrasound.            Nov 21, 2018  1:00 PM CST   Nurse Visit with [facility] Nurse   [facility] ([facility])    [place] [facility] [address]  [address]  [address]  [facility] [address]   [phone]            Nov 21, 2018  1:45 PM CST   NEW OB with Zeenat Lopes MD   [facility] ([facility])    [place] [facility] [address]  [address]  [address]  [facility] [address]   [phone]            Nov 23, 2018 11:00 AM CST   DANILO TERANP SINGLE with URMFMUSR1   [facility] - [place] ([facility])    [address]  [facility] [address]   [phone]            Nov 23, 2018 11:30 AM CST   Radiology MD with UR DANILO LIRA   [facility] [place] ([facility])    [address]  [facility] [address]   [phone]           Please arrive at the time given for your first appointment. This visit is used internally to schedule the physician's time during your ultrasound.            Nov 30,  "2018 11:45 AM SAVI CARRASCO US COMPRE SINGLE F/U with URMFMUSR2   [facility] Maternal Fetal Medicine Ultrasound - [place] ([facility])    [address]  [facility] [address] [phone]           Wear comfortable clothes and leave your valuables at home.            Nov 30, 2018 12:15 PM CST   Radiology MD with UR DANILO LIRA   [facility] Maternal Fetal Medicine - [facility])    [address]  [facility] [address]   [phone]           Please arrive at the time given for your first appointment. This visit is used internally to schedule the physician's time during your ultrasound.              Who to contact     If you have questions or need follow up information about today's clinic visit or your schedule please contact [facility]ETAL [facility] directly at [phone].  Normal or non-critical lab and imaging results will be communicated to you by Zusehart, letter or phone within 4 business days after the clinic has received the results. If you do not hear from us within 7 days, please contact the clinic through [email or web address] or phone. If you have a critical or abnormal lab result, we will notify you by phone as soon as possible.  Submit refill requests through Dials or call your pharmacy and they will forward the refill request to us. Please allow 3 business days for your refill to be completed.          Additional Information About Your Visit        Dials Information     Dials lets you send messages to your doctor, view your test results, renew your prescriptions, schedule appointments and more. To sign up, go to [email or web address] . Click on \"Log in\" on the left side of the screen, which will take you to the Welcome page. Then click on \"Sign up Now\" on the right side of the page.     You will be asked to enter the access code listed below, as well as some personal information. Please follow the " directions to create your username and password.     Your access code is: [email or web address]  Expires: 2018 11:10 AM     Your access code will  in 90 days. If you need help or a new code, please call your [place] clinic or [phone].        Care EveryWhere ID     This is your Care EveryWhere ID. This could be used by other organizations to access your [place] medical records  [id number]        Your Vitals Were     Last Period                   (LMP Unknown)            Blood Pressure from Last 3 Encounters:   17 126/75   17 119/79   04/10/17 123/80    Weight from Last 3 Encounters:   17 82.6 kg (182 lb)   04/10/17 82.6 kg (182 lb)   17 82.6 kg (182 lb)              Today, you had the following     No orders found for display       Primary Care Provider Office Phone # Fax #    Lilly Cartwright [phone]       [facility] [address] [facility] [address]        Equal Access to Services     [facility]: Hadii maryann braxton Soigor, waaxda luqadaha, qaybta kaalmanadege cordova, hubert gutierres . So [facility] [phone].    ATENCIÓN: Si habla español, tiene a lozada disposición servicios gratuitos de asistencia lingüística. Llame al [phone].    We comply with applicable federal civil rights laws and [place] laws. We do not discriminate on the basis of race, color, national origin, age, disability, sex, sexual orientation, or gender identity.            Thank you!     Thank you for choosing [facility]  for your care. Our goal is always to provide you with excellent care. Hearing back from our patients is one way we can continue to improve our services. Please take a few minutes to complete the written survey that you may receive in the mail after your visit with us. Thank you!             Your Updated Medication List - Protect others around you: Learn how to safely use, store and throw away your  medicines at www.disposemymeds.org.          This list is accurate as of 11/16/18  9:56 AM.  Always use your most recent med list.                   Brand Name Dispense Instructions for use Diagnosis    prenatal multivitamin plus iron 27-0.8 MG Tabs per tablet     30 tablet    Take 1 tablet by mouth daily    HRP (high risk pregnancy), third trimester       vitamin D 2000 units Caps     90 capsule    Take 1 capsule by mouth daily Take one capsule daily.    Hypovitaminosis D

## 2018-11-21 ENCOUNTER — OFFICE VISIT (OUTPATIENT)
Dept: OBGYN | Facility: CLINIC | Age: 43
End: 2018-11-21
Payer: COMMERCIAL

## 2018-11-21 VITALS
DIASTOLIC BLOOD PRESSURE: 70 MMHG | HEART RATE: 97 BPM | HEIGHT: 61 IN | BODY MASS INDEX: 36.93 KG/M2 | SYSTOLIC BLOOD PRESSURE: 108 MMHG | WEIGHT: 195.6 LBS

## 2018-11-21 DIAGNOSIS — O09.299 HX OF PRE-ECLAMPSIA IN PRIOR PREGNANCY, CURRENTLY PREGNANT: ICD-10-CM

## 2018-11-21 DIAGNOSIS — Z3A.37 37 WEEKS GESTATION OF PREGNANCY: Primary | ICD-10-CM

## 2018-11-21 LAB
ALT SERPL W P-5'-P-CCNC: 14 U/L (ref 0–50)
AST SERPL W P-5'-P-CCNC: 12 U/L (ref 0–45)
BASOPHILS # BLD AUTO: 0.1 10E9/L (ref 0–0.2)
BASOPHILS NFR BLD AUTO: 0.4 %
CREAT SERPL-MCNC: 0.44 MG/DL (ref 0.52–1.04)
CREAT UR-MCNC: 30 MG/DL
DIFFERENTIAL METHOD BLD: ABNORMAL
EOSINOPHIL # BLD AUTO: 0.5 10E9/L (ref 0–0.7)
EOSINOPHIL NFR BLD AUTO: 4.3 %
ERYTHROCYTE [DISTWIDTH] IN BLOOD BY AUTOMATED COUNT: 12.7 % (ref 10–15)
GFR SERPL CREATININE-BSD FRML MDRD: >90 ML/MIN/1.7M2
HCT VFR BLD AUTO: 35.9 % (ref 35–47)
HGB BLD-MCNC: 11.7 G/DL (ref 11.7–15.7)
IMM GRANULOCYTES # BLD: 0.1 10E9/L (ref 0–0.4)
IMM GRANULOCYTES NFR BLD: 0.6 %
LYMPHOCYTES # BLD AUTO: 3.7 10E9/L (ref 0.8–5.3)
LYMPHOCYTES NFR BLD AUTO: 31.5 %
MCH RBC QN AUTO: 29.7 PG (ref 26.5–33)
MCHC RBC AUTO-ENTMCNC: 32.6 G/DL (ref 31.5–36.5)
MCV RBC AUTO: 91 FL (ref 78–100)
MONOCYTES # BLD AUTO: 0.6 10E9/L (ref 0–1.3)
MONOCYTES NFR BLD AUTO: 5 %
NEUTROPHILS # BLD AUTO: 6.8 10E9/L (ref 1.6–8.3)
NEUTROPHILS NFR BLD AUTO: 58.2 %
NRBC # BLD AUTO: 0 10*3/UL
NRBC BLD AUTO-RTO: 0 /100
PLATELET # BLD AUTO: 259 10E9/L (ref 150–450)
PROT UR-MCNC: 0.06 G/L
PROT/CREAT 24H UR: 0.21 G/G CR (ref 0–0.2)
RBC # BLD AUTO: 3.94 10E12/L (ref 3.8–5.2)
WBC # BLD AUTO: 11.7 10E9/L (ref 4–11)

## 2018-11-21 PROCEDURE — 82565 ASSAY OF CREATININE: CPT | Performed by: OBSTETRICS & GYNECOLOGY

## 2018-11-21 PROCEDURE — 84450 TRANSFERASE (AST) (SGOT): CPT | Performed by: OBSTETRICS & GYNECOLOGY

## 2018-11-21 PROCEDURE — 87653 STREP B DNA AMP PROBE: CPT | Performed by: OBSTETRICS & GYNECOLOGY

## 2018-11-21 PROCEDURE — 36415 COLL VENOUS BLD VENIPUNCTURE: CPT | Performed by: OBSTETRICS & GYNECOLOGY

## 2018-11-21 PROCEDURE — 84460 ALANINE AMINO (ALT) (SGPT): CPT | Performed by: OBSTETRICS & GYNECOLOGY

## 2018-11-21 PROCEDURE — 85025 COMPLETE CBC W/AUTO DIFF WBC: CPT | Performed by: OBSTETRICS & GYNECOLOGY

## 2018-11-21 PROCEDURE — 84156 ASSAY OF PROTEIN URINE: CPT | Performed by: OBSTETRICS & GYNECOLOGY

## 2018-11-21 PROCEDURE — G0463 HOSPITAL OUTPT CLINIC VISIT: HCPCS | Mod: ZF

## 2018-11-21 ASSESSMENT — PAIN SCALES - GENERAL: PAINLEVEL: NO PAIN (0)

## 2018-11-21 NOTE — NURSING NOTE
Chief Complaint   Patient presents with     Transferred OB Care     Health Maintenance Due   Topic Date Due     PHQ-2 Q1 YR  02/24/2018     PAP SCREENING Q3 YR (SYSTEM ASSIGNED)  04/15/2018     LIPID SCREEN Q5 YR FEMALE (SYSTEM ASSIGNED)  05/17/2018     MATERNAL SCREENING  06/14/2018     OBGCT (OB)  08/16/2018     RH IMMUNE GLOBULIN (OB)  09/13/2018     GROUP B STREP SCREENING  11/08/2018       Yessica Hahn CMA on 11/21/2018 at 1:41 PM

## 2018-11-21 NOTE — PROGRESS NOTES
[facility]  New Obstetrics Visit    HPI: Ms. Tamez is a 43 year old  at 37w6d by 29w6d US here as a transfer of care from [facility]. She is planning to deliver at [facility].  She reports feeling well and denies any concerns. She reports good fetal movement and denies cramping, contractions, vaginal bleeding, and loss of fluid. She denies headache, changes in vision, chest pain, shortness of breath, and RUQ pain.     Patient seen with in-person .    Pregnancy notable for:  - Late entry to prenatal care - established at 29w6d, records from [facility] in Care Everywhere  - AMA - followed by weekly BPP  - Grandmultiparity - no history of PPH or shoulder dystocia.   - History of female circumcision  - Failed GCT, passed GTT  - Intrauterine growth restriction - AC 6 %ile at 29w6d, followed with q3w growth ultrasounds by M. Most recent scan on 18 noted good interval growth.  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/2013, per chart review was treated for 2 months only    OBHx  Obstetric History       T8      L7     SAB0   TAB0   Ectopic0   Multiple0   Live Births8       # Outcome Date GA Lbr Michael/2nd Weight Sex Delivery Anes PTL Lv   10 Current            9 Term 17 42w3d 07:38 / 00:12 3.62 kg (7 lb 15.7 oz) M   N STEVEN      Name: SHANKAR,MARIANELA HARIS      Apgar1:  7                Apgar5: 9   8 Term 05/14/15 38w3d  2.268 kg (5 lb) F Vag-Spont None  STEVEN      Apgar1:  9                Apgar5: 9   7 AB      AB, COMPLETE      6 Term         ND   5 Term         STEVEN   4 Term         STEVEN   3 Term         STEVEN   2 Term         STEVEN   1 Term         STEVEN        PMHx: Headaches (outside of pregnancy)  PSHx: None  Meds: Prenatal vitamin, Vitamin D  Allergies:  NKDA    SocHx: Nonsmoker, no alcohol or drug use    FamHx:  No family history of bleeding or clotting disorders    ROS: 10-Point ROS negative except as noted in HPI    Physical  "Exam  /70 (BP Location: Right arm, Patient Position: Sitting, Cuff Size: Adult Large)  Pulse 97  Ht 1.549 m (5' 1\")  Wt 88.7 kg (195 lb 9.6 oz)  LMP  (LMP Unknown)  BMI 36.96 kg/m2  Gen: Well-appearing, NAD  CV:  Regular rate  Pulm: Breathing comfortably on room air  Abd: Soft, gravid, nontender. Cephalic by Leopold's. FHT 37 cm. -150 bpm.  : GBS swab obtained  Ext: No LE edema    Imaging  US 18: 29w6d. EFW 1379g (42 %ile), AC 6 %ile, anterior placenta, LIBERTY wnl, 3VC  US10/19/18: 33w1d. EFW 1995g (35 %ile)  US 18: 36w1d. EFW 2440g (18 %ile). Per MFM notes, \"growth parameters and estimated fetal weight were consistent with an overall appropriate for gestation age pattern of growth. There has been appropriate interval fetal growth.\"   18: 88 BPP       Labs:  Rh negative, antibody screen negative  GC/CT negative  HBsAg neg/HIV neg/RPR nonreactive  Rubella immune    GTT 86/181/109/49  Pap NILM, HPV neg 2015    Assessment/Plan:  Ms. Tricia Tamez is a 43 year old  at 37w6d by 29w6d US here as a transfer of care. She plans to deliver at [facility]. Pregnancy is complicated by late entry into prenatal care, grandmultiparity, AMA, and history of preeclampsia.    Prenatal care  - Prenatal labs listed above  - GBS collected today   - S/p TDaP, flu vaccines (10/26/2018)  - Failed GCT, passed GTT  - Vitamin D deficiency, on daily Vit D  - Reviewed signs/symptoms of labor and when to call or present to L&D for evaluation.    Grandmultiparity  - Type and screen at delivery. No history of PPH per patient report.    AMA  - Weekly BPP. Next scheduled for   - Plan for IOL at 39 weeks if not delivered prior to this GA or sooner if indicated by  results.    History of preeclampsia   - Normal BP in clinic today. Asymptomatic.  - Baseline HELLP labs ordered. Patient prefers phone call with  if the results are abnormal.      Follow up in 1 week. Continue weekly BPP, " growth ultrasound q3 weeks.    Zeenat Lopes MD  OB/GYN, PGY3  11/21/18    The Patient was seen in [facility] by    ZEENAT LOPES  [facility].  I reviewed the history & exam. Assessment and plan were jointly made.    Beryl Wan MD

## 2018-11-21 NOTE — MR AVS SNAPSHOT
After Visit Summary   11/21/2018    Tricia Tamez    MRN: [id number]           Patient Information     Date Of Birth          1975        Visit Information        Provider Department      11/21/2018 1:30 PM Zeenat Lopes MD; LANGUAGE [facility]        Today's Diagnoses     37 weeks gestation of pregnancy    -  1    Hx of pre-eclampsia in prior pregnancy, currently pregnant          Care Instructions    Please go down to the lab for you blood and urine tests. We will call you if there are any abnormal results. Please schedule a prenatal visit in 1 week.           Follow-ups after your visit        Follow-up notes from your care team     Return in about 1 week (around 11/28/2018).      Your next 10 appointments already scheduled     Nov 23, 2018 11:00 AM SAVI CARRASCO BPP SINGLE with URMFMUSR1   [facility])    [address]  [facility] [address] [phone]            Nov 23, 2018 11:30 AM CST   Radiology MD with INOCENCIO CARRASCO MD   [facility])    [address]  [facility] [address]   [phone]           Please arrive at the time given for your first appointment. This visit is used internally to schedule the physician's time during your ultrasound.            Nov 30, 2018 11:45 AM SAVI JOSE COMPRE SINGLE F/U with URMFMUSR2   [facility] [place] ([facility])    [address]  [facility] [address] [phone]           Wear comfortable clothes and leave your valuables at home.            Nov 30, 2018 12:15 PM CST   Radiology MD with INOCENCIO CARRASCO MD   [facility] [place] ([facility])    [address]  [facility] [address]   [phone]        "    Please arrive at the time given for your first appointment. This visit is used internally to schedule the physician's time during your ultrasound.              Who to contact     Please call your clinic at [phone] to:    Ask questions about your health    Make or cancel appointments    Discuss your medicines    Learn about your test results    Speak to your doctor            Additional Information About Your Visit        MyChart Information     mPay Gateway is an electronic gateway that provides easy, online access to your medical records. With mPay Gateway, you can request a clinic appointment, read your test results, renew a prescription or communicate with your care team.     To sign up for mPay Gateway visit the website at [email or web address]   You will be asked to enter the access code listed below, as well as some personal information. Please follow the directions to create your username and password.     Your access code is: [email or web address]  Expires: 2018 11:10 AM     Your access code will  in 90 days. If you need help or a new code, please contact your [facility] or call [phone] for assistance.        Care EveryWhere ID     This is your Care EveryWhere ID. This could be used by other organizations to access your [place] medical records  [id number]        Your Vitals Were     Pulse Height Last Period BMI (Body Mass Index)          97 1.549 m (5' 1\") (LMP Unknown) 36.96 kg/m2         Blood Pressure from Last 3 Encounters:   18 108/70   17 126/75   17 119/79    Weight from Last 3 Encounters:   18 88.7 kg (195 lb 9.6 oz)   17 82.6 kg (182 lb)   04/10/17 82.6 kg (182 lb)              We Performed the Following     ABO and Rh     ALT     Antibody screen red cell     AST     CBC with Platelets Differential     CBC with platelets     Chlamydia trachomatis PCR     Creatinine     Glucose tolerance gest std 100 gm 3 hr     Group B strep PCR "     Hepatitis B surface antigen     HIV Antigen Antibody Combo     Neisseria gonorrhoeae PCR     OB hemoglobin     Protein  random urine with Creat Ratio     Urine Culture Aerobic Bacterial     Vitamin D Deficiency        Primary Care Provider Office Phone # Fax #    Lilly Cartwright [phone]       [facility] [address]  [facility] [address]        Equal Access to Services     PATRICE GOMEZ : Hadii aad ku hadasho Soomaali, waaxda luqadaha, qaybta kaalmada adeegyada, waxkrysten orellanain hayaan adeshayne khvonniesukumar labria . So [facility] [phone].    ATENCIÓN: Si habla español, tiene a lozada disposición servicios gratuitos de asistencia lingüística. Breanna al [phone].    We comply with applicable federal civil rights laws and [place] laws. We do not discriminate on the basis of race, color, national origin, age, disability, sex, sexual orientation, or gender identity.            Thank you!     Thank you for choosing [facility]  for your care. Our goal is always to provide you with excellent care. Hearing back from our patients is one way we can continue to improve our services. Please take a few minutes to complete the written survey that you may receive in the mail after your visit with us. Thank you!             Your Updated Medication List - Protect others around you: Learn how to safely use, store and throw away your medicines at www.disposemymeds.org.          This list is accurate as of 11/21/18  2:12 PM.  Always use your most recent med list.                   Brand Name Dispense Instructions for use Diagnosis    prenatal multivitamin plus iron 27-0.8 MG Tabs per tablet     30 tablet    Take 1 tablet by mouth daily    HRP (high risk pregnancy), third trimester       vitamin D 2000 units Caps     90 capsule    Take 1 capsule by mouth daily Take one capsule daily.    Hypovitaminosis D

## 2018-11-21 NOTE — PATIENT INSTRUCTIONS
Please go down to the lab for you blood and urine tests. We will call you if there are any abnormal results. Please schedule a prenatal visit in 1 week.

## 2018-11-22 LAB
GP B STREP DNA SPEC QL NAA+PROBE: NEGATIVE
SPECIMEN SOURCE: NORMAL

## 2018-11-23 ENCOUNTER — OFFICE VISIT (OUTPATIENT)
Dept: MATERNAL FETAL MEDICINE | Facility: CLINIC | Age: 43
End: 2018-11-23
Attending: OBSTETRICS & GYNECOLOGY
Payer: COMMERCIAL

## 2018-11-23 ENCOUNTER — HOSPITAL ENCOUNTER (OUTPATIENT)
Dept: ULTRASOUND IMAGING | Facility: CLINIC | Age: 43
Discharge: HOME | End: 2018-11-23
Attending: OBSTETRICS & GYNECOLOGY | Admitting: SOCIAL WORKER
Payer: COMMERCIAL

## 2018-11-23 DIAGNOSIS — O09.523 ADVANCED MATERNAL AGE IN MULTIGRAVIDA, THIRD TRIMESTER: Primary | ICD-10-CM

## 2018-11-23 DIAGNOSIS — O09.523 ELDERLY MULTIGRAVIDA IN THIRD TRIMESTER: ICD-10-CM

## 2018-11-23 PROCEDURE — 76819 FETAL BIOPHYS PROFIL W/O NST: CPT

## 2018-11-23 NOTE — PROGRESS NOTES
Please see ultrasound report under imaging tab for details on ultrasound performed today.    Betsy Soto MD  , OB/GYN  Maternal-Fetal Medicine  [email or web address]  [phone] (Academic office)  [phone] (Pager)

## 2018-11-23 NOTE — MR AVS SNAPSHOT
After Visit Summary   11/23/2018    Tricia Tamez    MRN: [id number]           Patient Information     Date Of Birth          1975        Visit Information        Provider Department      11/23/2018 11:30 AM Betsy Soto MD [facility] Maternal Fetal Medicine - [place]        Today's Diagnoses     Advanced maternal age in multigravida, third trimester    -  1       Follow-ups after your visit        Your next 10 appointments already scheduled     Nov 29, 2018 10:45 AM CST   RETURN OB with Alana Brito MD   [facility] ([facility])    [place] [facility] [address]  [address]  [address]  [facility] [address]   [phone]            Nov 30, 2018 11:45 AM CST   MFM US COMPRE SINGLE F/U with [id number]   [facility] Maternal Fetal Medicine Ultrasound - [place] ([facility])    [address]  [facility] [address] [phone]           Wear comfortable clothes and leave your valuables at home.            Nov 30, 2018 12:15 PM CST   Radiology MD with UR DANILO LIRA   [facility] - [facility])    [address]  [facility] [address] [phone]           Please arrive at the time given for your first appointment. This visit is used internally to schedule the physician's time during your ultrasound.              Who to contact     If you have questions or need follow up information about today's clinic visit or your schedule please contact [facility] MATERNAL FETAL MEDICINE [facility] directly at [phone].  Normal or non-critical lab and imaging results will be communicated to you by MyChart, letter or phone within 4 business days after the clinic has received the results. If you do not hear from us within 7 days, please contact the clinic through MyChart or phone. If you have a critical or abnormal lab result, we will notify you by  "phone as soon as possible.  Submit refill requests through WiSpry or call your pharmacy and they will forward the refill request to us. Please allow 3 business days for your refill to be completed.          Additional Information About Your Visit        NetevenharPairy Information     WiSpry lets you send messages to your doctor, view your test results, renew your prescriptions, schedule appointments and more. To sign up, go to [email or web address] . Click on \"Log in\" on the left side of the screen, which will take you to the Welcome page. Then click on \"Sign up Now\" on the right side of the page.     You will be asked to enter the access code listed below, as well as some personal information. Please follow the directions to create your username and password.     Your access code is: [email or web address]  Expires: 2018 11:10 AM     Your access code will  in 90 days. If you need help or a new code, please call your [place] clinic or [phone].        Care EveryWhere ID     This is your Care EveryWhere ID. This could be used by other organizations to access your [place] medical records  [id number]        Your Vitals Were     Last Period                   (LMP Unknown)            Blood Pressure from Last 3 Encounters:   18 108/70   17 126/75   17 119/79    Weight from Last 3 Encounters:   18 88.7 kg (195 lb 9.6 oz)   17 82.6 kg (182 lb)   04/10/17 82.6 kg (182 lb)              Today, you had the following     No orders found for display       Primary Care Provider Office Phone # Fax #    Lilly Cartwright [phone]       [facility] [address]  [facility] [address]        Equal Access to Services     PATRICE GOMEZ : Rashmi Wilburn, jose eduardo mejia, brady cordova, hubert yin. So [facility] [phone].    ATENCIÓN: Si habla español, tiene a lozada disposición servicios gratuitos de asistencia lingüística. " Breanna porter [phone].    We comply with applicable federal civil rights laws and [place] laws. We do not discriminate on the basis of race, color, national origin, age, disability, sex, sexual orientation, or gender identity.            Thank you!     Thank you for choosing [facility]  for your care. Our goal is always to provide you with excellent care. Hearing back from our patients is one way we can continue to improve our services. Please take a few minutes to complete the written survey that you may receive in the mail after your visit with us. Thank you!             Your Updated Medication List - Protect others around you: Learn how to safely use, store and throw away your medicines at www.disposemymeds.org.          This list is accurate as of 11/23/18 12:59 PM.  Always use your most recent med list.                   Brand Name Dispense Instructions for use Diagnosis    prenatal multivitamin plus iron 27-0.8 MG Tabs per tablet     30 tablet    Take 1 tablet by mouth daily    HRP (high risk pregnancy), third trimester       vitamin D 2000 units Caps     90 capsule    Take 1 capsule by mouth daily Take one capsule daily.    Hypovitaminosis D

## 2018-11-29 ENCOUNTER — OFFICE VISIT (OUTPATIENT)
Dept: OBGYN | Facility: CLINIC | Age: 43
End: 2018-11-29
Attending: OBSTETRICS & GYNECOLOGY
Payer: COMMERCIAL

## 2018-11-29 VITALS
DIASTOLIC BLOOD PRESSURE: 71 MMHG | SYSTOLIC BLOOD PRESSURE: 107 MMHG | BODY MASS INDEX: 36.28 KG/M2 | WEIGHT: 192 LBS | HEART RATE: 98 BPM

## 2018-11-29 DIAGNOSIS — O09.30 SUPERVISION OF PREGNANCY WITH INSUFFICIENT ANTENATAL CARE: ICD-10-CM

## 2018-11-29 DIAGNOSIS — O09.93 HIGH-RISK PREGNANCY IN THIRD TRIMESTER: Primary | ICD-10-CM

## 2018-11-29 DIAGNOSIS — O09.523 ELDERLY MULTIGRAVIDA IN THIRD TRIMESTER: ICD-10-CM

## 2018-11-29 PROCEDURE — T1013 SIGN LANG/ORAL INTERPRETER: HCPCS | Mod: U3,ZF

## 2018-11-29 PROCEDURE — G0463 HOSPITAL OUTPT CLINIC VISIT: HCPCS | Mod: ZF

## 2018-11-29 ASSESSMENT — PAIN SCALES - GENERAL: PAINLEVEL: NO PAIN (0)

## 2018-11-29 NOTE — PROGRESS NOTES
Subjective:     43 year old  at 39w0d presents for routine prenatal visit.            No vaginal bleeding or leakage of fluid.  No contractions.  Normal fetal movement.        No HA, visual changes, RUQ or epigastric pain.   Patient concerns: None, is against induction of labor.  States swelling in hands and feet has been there for a while.  Denies headache or vision changes.  Plans to await spontaneous labor.  Feeling well overall.  Objective:  Vitals:    18 1030   BP: 107/71   Pulse: 98   Weight: 87.1 kg (192 lb)    See OB flowsheet  Assessment/Plan     Encounter Diagnoses   Name Primary?     High-risk pregnancy in third trimester Yes     Elderly multigravida in third trimester      Supervision of pregnancy with insufficient  care      No orders of the defined types were placed in this encounter.    No orders of the defined types were placed in this encounter.  - Fetal tachycardia.  Reviewed with patient with assistance of in person  that this could be a sign of fetal distress.  Recommend extended monitoring on L&D even if she declines IOL.  Reviewed risk of fetal demise.  Patient present with son and both express understanding.  She declines further assessment.  Reviewed importance of monitoring fetal movements if decreased or changes or any bleeding/contractions should present immediately to L&D.  Patient does agree to BPP scheduled for tomorrow, declines BPP or NST in clinic today.  - Reviewed recommendation for IOL given AMA>40 years.  As above patient declines.  Agrees to weekly BPP.   - Reviewed why/how to contact provider if headache/visual changes/RUQ or epigastric pain, decreased fetal movement, vaginal bleeding, leakage of fluid or strong/regular contractions.  - edema on exam today, patient with normal BP and no symptoms of preeclampsia   Patient education/orders or handouts today:    Reviewed with patient recommend she present to L&D as soon as she is able, otherwise u  /s tomorrow and clinic visit in 1 week.    Alana Brito MD

## 2018-11-29 NOTE — MR AVS SNAPSHOT
After Visit Summary   2018    Tricia Tamez    MRN: [id number]           Patient Information     Date Of Birth          1975        Visit Information        Provider Department      2018 10:45 AM Kelsey Corona Mary Ida, MD [facility]        Today's Diagnoses     High-risk pregnancy in third trimester    -  1    Elderly multigravida in third trimester        Supervision of pregnancy with insufficient  care           Follow-ups after your visit        Your next 10 appointments already scheduled     2018 11:45 AM CST   MFM US COMPRE SINGLE F/U with URMFMUSR2   [facility] - [facility])    [address]  [facility] [address] [phone]           Wear comfortable clothes and leave your valuables at home.            2018 12:15 PM CST   Radiology MD with UR DANILO LIRA   [facility] - [facility])    [address]  [facility] [address] [phone]           Please arrive at the time given for your first appointment. This visit is used internally to schedule the physician's time during your ultrasound.              Who to contact     Please call your clinic at [phone] to:    Ask questions about your health    Make or cancel appointments    Discuss your medicines    Learn about your test results    Speak to your doctor            Additional Information About Your Visit        MyChart Information     Parkzzzt is an electronic gateway that provides easy, online access to your medical records. With Cheyipai, you can request a clinic appointment, read your test results, renew a prescription or communicate with your care team.     To sign up for Parkzzzt visit the website at [email or web address]   You will be asked to enter the access code listed below, as well as some  personal information. Please follow the directions to create your username and password.     Your access code is: [email or web address]  Expires: 2018 11:10 AM     Your access code will  in 90 days. If you need help or a new code, please contact your [facility] or call [phone] for assistance.        Care EveryWhere ID     This is your Care EveryWhere ID. This could be used by other organizations to access your [place] medical records  [id number]        Your Vitals Were     Pulse Last Period Breastfeeding? BMI (Body Mass Index)          98 (LMP Unknown) No 36.28 kg/m2         Blood Pressure from Last 3 Encounters:   18 107/71   18 108/70   17 126/75    Weight from Last 3 Encounters:   18 87.1 kg (192 lb)   18 88.7 kg (195 lb 9.6 oz)   17 82.6 kg (182 lb)              Today, you had the following     No orders found for display       Primary Care Provider Office Phone # Fax #    Lilly Cartwright [phone]       [facility] [address]  [facility] [address]        Equal Access to Services     PATRICE GOMEZ : Hadradhika kruseo Soigor, waaxda luenio, qaybta kaalmada adecricketda, hubert yin. So [facility] [phone].    ATENCIÓN: Si habla español, tiene a lozada disposición servicios gratuitos de asistencia lingüística. Llame al [phone].    We comply with applicable federal civil rights laws and [place] laws. We do not discriminate on the basis of race, color, national origin, age, disability, sex, sexual orientation, or gender identity.            Thank you!     Thank you for choosing [facility]  for your care. Our goal is always to provide you with excellent care. Hearing back from our patients is one way we can continue to improve our services. Please take a few minutes to complete the written survey that you may receive in the mail after your visit with us. Thank  you!             Your Updated Medication List - Protect others around you: Learn how to safely use, store and throw away your medicines at www.disposemymeds.org.          This list is accurate as of 11/29/18 11:14 AM.  Always use your most recent med list.                   Brand Name Dispense Instructions for use Diagnosis    prenatal multivitamin w/iron 27-0.8 MG tablet     30 tablet    Take 1 tablet by mouth daily    HRP (high risk pregnancy), third trimester       vitamin D 2000 units Caps     90 capsule    Take 1 capsule by mouth daily Take one capsule daily.    Hypovitaminosis D

## 2018-11-29 NOTE — NURSING NOTE
Chief Complaint   Patient presents with     Prenatal Care     ROBERTO 39 weeks   Shanna Weaver LPN

## 2018-11-29 NOTE — LETTER
2018       RE: Tricia Tamez   [address]  [facility] [address]     Dear Colleague,    Thank you for referring your patient, Tricia Tamez, to the [facility] at [facility]. Please see a copy of my visit note below.    Subjective:     43 year old  at 39w0d presents for routine prenatal visit.            No vaginal bleeding or leakage of fluid.  No contractions.  Normal fetal movement.        No HA, visual changes, RUQ or epigastric pain.   Patient concerns: None, is against induction of labor.  States swelling in hands and feet has been there for a while.  Denies headache or vision changes.  Plans to await spontaneous labor.  Feeling well overall.  Objective:  Vitals:    18 1030   BP: 107/71   Pulse: 98   Weight: 87.1 kg (192 lb)    See OB flowsheet  Assessment/Plan     Encounter Diagnoses   Name Primary?     High-risk pregnancy in third trimester Yes     Elderly multigravida in third trimester      Supervision of pregnancy with insufficient  care      No orders of the defined types were placed in this encounter.    No orders of the defined types were placed in this encounter.  - Fetal tachycardia.  Reviewed with patient with assistance of in person  that this could be a sign of fetal distress.  Recommend extended monitoring on L&D even if she declines IOL.  Reviewed risk of fetal demise.  Patient present with son and both express understanding.  She declines further assessment.  Reviewed importance of monitoring fetal movements if decreased or changes or any bleeding/contractions should present immediately to L&D.  Patient does agree to BPP scheduled for tomorrow, declines BPP or NST in clinic today.  - Reviewed recommendation for IOL given AMA>40 years.  As above patient declines.  Agrees to weekly BPP.   - Reviewed why/how to contact provider if headache/visual changes/RUQ or epigastric pain, decreased fetal  movement, vaginal bleeding, leakage of fluid or strong/regular contractions.  - edema on exam today, patient with normal BP and no symptoms of preeclampsia   Patient education/orders or handouts today:    Reviewed with patient recommend she present to L&D as soon as she is able, otherwise u /s tomorrow and clinic visit in 1 week.    Alana Brito MD

## 2018-11-30 ENCOUNTER — HOSPITAL ENCOUNTER (OUTPATIENT)
Dept: ULTRASOUND IMAGING | Facility: CLINIC | Age: 43
Discharge: HOME | End: 2018-11-30
Attending: OBSTETRICS & GYNECOLOGY | Admitting: OBSTETRICS & GYNECOLOGY
Payer: COMMERCIAL

## 2018-11-30 ENCOUNTER — OFFICE VISIT (OUTPATIENT)
Dept: MATERNAL FETAL MEDICINE | Facility: CLINIC | Age: 43
End: 2018-11-30
Attending: OBSTETRICS & GYNECOLOGY
Payer: COMMERCIAL

## 2018-11-30 DIAGNOSIS — O09.523 ELDERLY MULTIGRAVIDA IN THIRD TRIMESTER: ICD-10-CM

## 2018-11-30 DIAGNOSIS — O09.523 ELDERLY MULTIGRAVIDA IN THIRD TRIMESTER: Primary | ICD-10-CM

## 2018-11-30 DIAGNOSIS — O09.293 IUGR (INTRAUTERINE GROWTH RETARDATION) IN PRIOR PREGNANCY, PREGNANT, THIRD TRIMESTER: ICD-10-CM

## 2018-11-30 PROCEDURE — 76816 OB US FOLLOW-UP PER FETUS: CPT

## 2018-11-30 PROCEDURE — 76820 UMBILICAL ARTERY ECHO: CPT | Performed by: OBSTETRICS & GYNECOLOGY

## 2018-11-30 NOTE — MR AVS SNAPSHOT
After Visit Summary   11/30/2018    Tricia Tamez    MRN: [id number]           Patient Information     Date Of Birth          1975        Visit Information        Provider Department      11/30/2018 12:15 PM Missy Naqvi,  [facility] Maternal Fetal Medicine [facility]        Today's Diagnoses     Elderly multigravida in third trimester    -  1    IUGR (intrauterine growth retardation) in prior pregnancy, pregnant, third trimester           Follow-ups after your visit        Your next 10 appointments already scheduled     Dec 06, 2018  9:00 AM CST   RETURN OB with Nora Del Toro MD   [facility] ([facility])    [place] [facility] [address]  [address]  [address]  [facility] [address]   [phone]            Dec 06, 2018 11:45 AM CST   MONI LEON SINGLE with [id number]   [facility] Maternal Fetal Medicine Ultrasound - [place] ([facility])    [address]  [facility] [id number] [phone]            Dec 06, 2018 12:15 PM CST   Radiology MD with UR DANILO LIRA   [facility] Maternal Fetal Medicine - [place] ([facility])    [address]  [facility] [address]   [phone]           Please arrive at the time given for your first appointment. This visit is used internally to schedule the physician's time during your ultrasound.              Future tests that were ordered for you today     Open Future Orders        Priority Expected Expires Ordered    [facility] BP Single Routine  9/30/2019 11/30/2018            Who to contact     If you have questions or need follow up information about today's clinic visit or your schedule please contact [facility] MATERNAL FETAL MEDICINE - [place] directly at [phone].  Normal or non-critical lab and imaging results will be communicated to you by MyChart, letter or phone within 4 business days after the clinic has received  "the results. If you do not hear from us within 7 days, please contact the clinic through CultureMap or phone. If you have a critical or abnormal lab result, we will notify you by phone as soon as possible.  Submit refill requests through CultureMap or call your pharmacy and they will forward the refill request to us. Please allow 3 business days for your refill to be completed.          Additional Information About Your Visit        [employer] Information     CultureMap lets you send messages to your doctor, view your test results, renew your prescriptions, schedule appointments and more. To sign up, go to [email or web address] . Click on \"Log in\" on the left side of the screen, which will take you to the Welcome page. Then click on \"Sign up Now\" on the right side of the page.     You will be asked to enter the access code listed below, as well as some personal information. Please follow the directions to create your username and password.     Your access code is: [email or web address]  Expires: 2018 11:10 AM     Your access code will  in 90 days. If you need help or a new code, please call your [place] clinic or [phone].        Care EveryWhere ID     This is your Care EveryWhere ID. This could be used by other organizations to access your [place] medical records  [id number]        Your Vitals Were     Last Period                   (LMP Unknown)            Blood Pressure from Last 3 Encounters:   18 107/71   18 108/70   17 126/75    Weight from Last 3 Encounters:   18 87.1 kg (192 lb)   18 88.7 kg (195 lb 9.6 oz)   17 82.6 kg (182 lb)               Primary Care Provider Office Phone # Fax #    Lilly Cartwright [phone]       [facility] [address]  [facility] [address]        Equal Access to Services     PATRICE GOMEZ AH: Rashmi Wilburn, warefugio shookqdania, qaybta jessalhubert zuniga. So wa " [phone].    ATENCIÓN: Si ijeoma bonilla, tiene a lozada disposición servicios gratuitos de asistencia lingüística. Breanna al [phone].    We comply with applicable federal civil rights laws and [place] laws. We do not discriminate on the basis of race, color, national origin, age, disability, sex, sexual orientation, or gender identity.            Thank you!     Thank you for choosing [facility]  for your care. Our goal is always to provide you with excellent care. Hearing back from our patients is one way we can continue to improve our services. Please take a few minutes to complete the written survey that you may receive in the mail after your visit with us. Thank you!             Your Updated Medication List - Protect others around you: Learn how to safely use, store and throw away your medicines at www.disposemymeds.org.          This list is accurate as of 11/30/18 12:33 PM.  Always use your most recent med list.                   Brand Name Dispense Instructions for use Diagnosis    prenatal multivitamin w/iron 27-0.8 MG tablet     30 tablet    Take 1 tablet by mouth daily    HRP (high risk pregnancy), third trimester       vitamin D 2000 units Caps     90 capsule    Take 1 capsule by mouth daily Take one capsule daily.    Hypovitaminosis D

## 2018-11-30 NOTE — PROGRESS NOTES
"Please see \"Imaging\" tab under \"Chart Review\" for details of today's US.      Missy Naqvi, DO  Maternal-Fetal Medicine        "

## 2018-11-30 NOTE — NURSING NOTE
Patient presents to [facility] for F/U Comp US and BPP d/t AMA and H/O IUGR/pre eclampsia at 39w1d--see US report.   Per prenatal record patient declined IOL at 39 weeks as recommended by DANILO LIRA.  Writer noted patient does not have a follow up OBV with her PCP.  Writer facilitated F/U OBV at [facility] on 12/6-patient accepted appointment date and time.   present for today's appointment.  Patient discharged in stable condition.    Kenya Pickett RN

## 2018-12-03 PROBLEM — Z36.89 ENCOUNTER FOR TRIAGE IN PREGNANT PATIENT: Status: RESOLVED | Noted: 2017-04-17 | Resolved: 2018-12-03

## 2018-12-03 PROBLEM — O99.810 PREGNANCY WITH ABNORMAL GLUCOSE TOLERANCE TEST (GTT): Status: RESOLVED | Noted: 2017-01-16 | Resolved: 2018-12-03

## 2018-12-03 PROBLEM — Z34.90 PREGNANCY: Status: RESOLVED | Noted: 2017-04-17 | Resolved: 2018-12-03

## 2018-12-03 PROBLEM — O36.5990 ASYMMETRIC IUGR AFFECTING PREGNANCY, ANTEPARTUM: Status: RESOLVED | Noted: 2017-03-21 | Resolved: 2018-12-03

## 2018-12-06 ENCOUNTER — HOSPITAL ENCOUNTER (OUTPATIENT)
Dept: ULTRASOUND IMAGING | Facility: CLINIC | Age: 43
Discharge: HOME | End: 2018-12-06
Attending: OBSTETRICS & GYNECOLOGY | Admitting: OBSTETRICS & GYNECOLOGY
Payer: COMMERCIAL

## 2018-12-06 ENCOUNTER — OFFICE VISIT (OUTPATIENT)
Dept: MATERNAL FETAL MEDICINE | Facility: CLINIC | Age: 43
End: 2018-12-06
Attending: OBSTETRICS & GYNECOLOGY
Payer: COMMERCIAL

## 2018-12-06 ENCOUNTER — OFFICE VISIT (OUTPATIENT)
Dept: OBGYN | Facility: CLINIC | Age: 43
End: 2018-12-06
Attending: OBSTETRICS & GYNECOLOGY
Payer: COMMERCIAL

## 2018-12-06 VITALS
BODY MASS INDEX: 36.8 KG/M2 | WEIGHT: 194.9 LBS | DIASTOLIC BLOOD PRESSURE: 73 MMHG | HEART RATE: 99 BPM | SYSTOLIC BLOOD PRESSURE: 111 MMHG | HEIGHT: 61 IN

## 2018-12-06 DIAGNOSIS — O09.523 ELDERLY MULTIGRAVIDA IN THIRD TRIMESTER: ICD-10-CM

## 2018-12-06 DIAGNOSIS — O09.893 SUPERVISION OF OTHER HIGH RISK PREGNANCIES, THIRD TRIMESTER: Primary | ICD-10-CM

## 2018-12-06 DIAGNOSIS — O36.5930 POOR FETAL GROWTH AFFECTING MANAGEMENT OF MOTHER IN THIRD TRIMESTER, SINGLE OR UNSPECIFIED FETUS: Primary | ICD-10-CM

## 2018-12-06 PROCEDURE — 76819 FETAL BIOPHYS PROFIL W/O NST: CPT

## 2018-12-06 PROCEDURE — G0463 HOSPITAL OUTPT CLINIC VISIT: HCPCS | Mod: ZF

## 2018-12-06 PROCEDURE — 76820 UMBILICAL ARTERY ECHO: CPT | Performed by: OBSTETRICS & GYNECOLOGY

## 2018-12-06 NOTE — LETTER
2018       RE: Tricia Tamez   [address]  [facility] [address]     Dear Colleague,    Thank you for referring your patient, Tricia Tamez, to the [facility] at [facility]. Please see a copy of my visit note below.    S:  Doing well, lots of FM, no contractions.      O: see flow  Declines cx check today    A:  44 y/o  at 40+0 weeks, doing well.  Pregnancy complicated by AMA, asymmetric IUGR    P:  Again discussed indications for IOL today-AMA and asymmetric IUGR.  Reviewed the increased risk of IUFD with age >40 and IUGR.  She declines, plans to wait for labor.  She will keep her BPP appointment today at 1145.  Labor precautions reviewed, RTC 1 week.    Nora Del Toro MD, FACOG

## 2018-12-06 NOTE — MR AVS SNAPSHOT
After Visit Summary   12/6/2018    Tricia Tamez    MRN: [id number]           Patient Information     Date Of Birth          1975        Visit Information        Provider Department      12/6/2018 12:15 PM Mik Cartagena MD [facility]        Today's Diagnoses     Poor fetal growth affecting management of mother in third trimester, single or unspecified fetus    -  1    Elderly multigravida in third trimester           Follow-ups after your visit        Your next 10 appointments already scheduled     Dec 10, 2018  1:30 PM CST   MFM BPP SINGLE with [id number]   [facility] - [place] ([facility])    [address]  [facility] [address] [phone]            Dec 10, 2018  2:00 PM CST   Radiology MD with UR DANILO LIRA   [facility] - [place] ([facility])    [address]  [facility] [id number] [phone]           Please arrive at the time given for your first appointment. This visit is used internally to schedule the physician's time during your ultrasound.            Dec 13, 2018 11:30 AM CST   RETURN OB with Betsy Sawant MD   [facility]    [place] [facility] [address]  [address]  [facility] [address] [phone]              Future tests that were ordered for you today     Open Future Orders        Priority Expected Expires Ordered    [facility] BP Single Routine 12/10/2018 10/6/2019 12/6/2018            Who to contact     If you have questions or need follow up information about today's clinic visit or your schedule please contact [facility]ETAL [facility]IC[facility] directly at [phone].  Normal or non-critical lab and imaging results will be communicated to you by MyChart, letter or phone within 4 business days after the clinic has  "received the results. If you do not hear from us within 7 days, please contact the clinic through Linkua or phone. If you have a critical or abnormal lab result, we will notify you by phone as soon as possible.  Submit refill requests through Linkua or call your pharmacy and they will forward the refill request to us. Please allow 3 business days for your refill to be completed.          Additional Information About Your Visit        [facility] [facility] Information     Linkua lets you send messages to your doctor, view your test results, renew your prescriptions, schedule appointments and more. To sign up, go to [email or web address] . Click on \"Log in\" on the left side of the screen, which will take you to the Welcome page. Then click on \"Sign up Now\" on the right side of the page.     You will be asked to enter the access code listed below, as well as some personal information. Please follow the directions to create your username and password.     Your access code is: [email or web address]  Expires: 2018 11:10 AM     Your access code will  in 90 days. If you need help or a new code, please call your [facility] clinic or [phone].        Care EveryWhere ID     This is your Care EveryWhere ID. This could be used by other organizations to access your [facility] medical records  [id number]        Your Vitals Were     Last Period                   (LMP Unknown)            Blood Pressure from Last 3 Encounters:   18 111/73   18 107/71   18 108/70    Weight from Last 3 Encounters:   18 88.4 kg (194 lb 14.4 oz)   18 87.1 kg (192 lb)   18 88.7 kg (195 lb 9.6 oz)               Primary Care Provider Office Phone # Fax #    Lilly Cartwright [phone]       [facility] [address]  [facility] [address]        Equal Access to Services     PATRICE GOMEZ AH: Rashmi Wilburn, waaxda luqadaha, qaybta jessalrama cordova, hubert yin. " So [facility] [phone].    ATENCIÓN: Si habla irene, tiene a lozada disposición servicios gratuitos de asistencia lingüística. Breanna al [phone].    We comply with applicable federal civil rights laws and [place] laws. We do not discriminate on the basis of race, color, national origin, age, disability, sex, sexual orientation, or gender identity.            Thank you!     Thank you for choosing [facility]  for your care. Our goal is always to provide you with excellent care. Hearing back from our patients is one way we can continue to improve our services. Please take a few minutes to complete the written survey that you may receive in the mail after your visit with us. Thank you!             Your Updated Medication List - Protect others around you: Learn how to safely use, store and throw away your medicines at www.disposemymeds.org.          This list is accurate as of 12/6/18  1:44 PM.  Always use your most recent med list.                   Brand Name Dispense Instructions for use Diagnosis    prenatal multivitamin w/iron 27-0.8 MG tablet     30 tablet    Take 1 tablet by mouth daily    HRP (high risk pregnancy), third trimester       vitamin D 2000 units Caps     90 capsule    Take 1 capsule by mouth daily Take one capsule daily.    Hypovitaminosis D

## 2018-12-06 NOTE — PROGRESS NOTES
"Please see \"Imaging\" tab under \"Chart Review\" for details of today's US at the [facility].    Mik Cartagena MD  Maternal-Fetal Medicine      "

## 2018-12-06 NOTE — MR AVS SNAPSHOT
After Visit Summary   12/6/2018    Tricia Tamez    MRN: [id number]           Patient Information     Date Of Birth          1975        Visit Information        Provider Department      12/6/2018 8:45 AM Nora Del Toro MD; TELEPHONE, [facility]        Today's Diagnoses     Supervision of other high risk pregnancies, third trimester    -  1    Elderly multigravida in third trimester           Follow-ups after your visit        Follow-up notes from your care team     Return in about 1 week (around 12/13/2018).      Your next 10 appointments already scheduled     Dec 10, 2018  1:30 PM CST   MFM BPP SINGLE with [id number]   [facility] - [place] ([facility])    [address]  [facility] [id number] [phone]            Dec 10, 2018  2:00 PM CST   Radiology MD with UR DANILO LIRA   [facility]    [address]  [facility] [address] [phone]           Please arrive at the time given for your first appointment. This visit is used internally to schedule the physician's time during your ultrasound.            Dec 13, 2018 11:30 AM CST   RETURN OB with Betsy Sawant MD   [facility] ([facility])    [place] [facility] [address]  [address]  [address]  [facility] [address] [phone]              Future tests that were ordered for you today     Open Future Orders        Priority Expected Expires Ordered    M BPP Single Routine 12/10/2018 10/6/2019 12/6/2018            Who to contact     Please call your clinic at [phone] to:    Ask questions about your health    Make or cancel appointments    Discuss your medicines    Learn about your test results    Speak to your doctor            Additional Information About Your Visit       "  MyChart Information     Machina is an electronic gateway that provides easy, online access to your medical records. With Machina, you can request a clinic appointment, read your test results, renew a prescription or communicate with your care team.     To sign up for Machina visit the website at [email or web address]   You will be asked to enter the access code listed below, as well as some personal information. Please follow the directions to create your username and password.     Your access code is: [email or web address]  Expires: 2018 11:10 AM     Your access code will  in 90 days. If you need help or a new code, please contact your [facility] or call [phone] for assistance.        Care EveryWhere ID     This is your Care EveryWhere ID. This could be used by other organizations to access your [facility] medical records  [id number]        Your Vitals Were     Pulse Height Last Period BMI (Body Mass Index)          99 1.549 m (5' 1\") (LMP Unknown) 36.83 kg/m2         Blood Pressure from Last 3 Encounters:   18 111/73   18 107/71   18 108/70    Weight from Last 3 Encounters:   18 88.4 kg (194 lb 14.4 oz)   18 87.1 kg (192 lb)   18 88.7 kg (195 lb 9.6 oz)              Today, you had the following     No orders found for display       Primary Care Provider Office Phone # Fax #    Lilly Cartwright [phone]       [facility] [address]  [facility] [address]        Equal Access to Services     ISAIAS GOMEZ : Hadii maryann Wilburn, wajuan fda roberto, qahernando mercadoalhubert zuniga. So [facility] [phone].    ATENCIÓN: Si habla español, tiene a lozada disposición servicios gratuitos de asistencia lingüística. Llame al [phone].    We comply with applicable federal civil rights laws and [place] laws. We do not discriminate on the basis of race, color, national origin, " age, disability, sex, sexual orientation, or gender identity.            Thank you!     Thank you for choosing [facility]  for your care. Our goal is always to provide you with excellent care. Hearing back from our patients is one way we can continue to improve our services. Please take a few minutes to complete the written survey that you may receive in the mail after your visit with us. Thank you!             Your Updated Medication List - Protect others around you: Learn how to safely use, store and throw away your medicines at www.disposemymeds.org.          This list is accurate as of 12/6/18  3:26 PM.  Always use your most recent med list.                   Brand Name Dispense Instructions for use Diagnosis    prenatal multivitamin w/iron 27-0.8 MG tablet     30 tablet    Take 1 tablet by mouth daily    HRP (high risk pregnancy), third trimester       vitamin D 2000 units Caps     90 capsule    Take 1 capsule by mouth daily Take one capsule daily.    Hypovitaminosis D

## 2018-12-06 NOTE — PROGRESS NOTES
S:  Doing well, lots of FM, no contractions.      O: see flow  Declines cx check today    A:  44 y/o  at 40+0 weeks, doing well.  Pregnancy complicated by AMA, asymmetric IUGR    P:  Again discussed indications for IOL today-AMA and asymmetric IUGR.  Reviewed the increased risk of IUFD with age >40 and IUGR.  She declines, plans to wait for labor.  She will keep her BPP appointment today at 1145.  Labor precautions reviewed, RTC 1 week.    Nora Del Toro MD, FACOG

## 2018-12-10 ENCOUNTER — HOSPITAL ENCOUNTER (OUTPATIENT)
Dept: ULTRASOUND IMAGING | Facility: CLINIC | Age: 43
Discharge: HOME | End: 2018-12-10
Attending: OBSTETRICS & GYNECOLOGY | Admitting: OBSTETRICS & GYNECOLOGY
Payer: COMMERCIAL

## 2018-12-10 ENCOUNTER — OFFICE VISIT (OUTPATIENT)
Dept: MATERNAL FETAL MEDICINE | Facility: CLINIC | Age: 43
End: 2018-12-10
Attending: OBSTETRICS & GYNECOLOGY
Payer: COMMERCIAL

## 2018-12-10 DIAGNOSIS — O36.5930 POOR FETAL GROWTH AFFECTING MANAGEMENT OF MOTHER IN THIRD TRIMESTER, SINGLE OR UNSPECIFIED FETUS: Primary | ICD-10-CM

## 2018-12-10 DIAGNOSIS — O09.523 ELDERLY MULTIGRAVIDA IN THIRD TRIMESTER: ICD-10-CM

## 2018-12-10 DIAGNOSIS — O36.5930 POOR FETAL GROWTH AFFECTING MANAGEMENT OF MOTHER IN THIRD TRIMESTER, SINGLE OR UNSPECIFIED FETUS: ICD-10-CM

## 2018-12-10 PROCEDURE — 76820 UMBILICAL ARTERY ECHO: CPT | Performed by: OBSTETRICS & GYNECOLOGY

## 2018-12-10 PROCEDURE — 59025 FETAL NON-STRESS TEST: CPT | Mod: ZF,59 | Performed by: OBSTETRICS & GYNECOLOGY

## 2018-12-10 PROCEDURE — 76819 FETAL BIOPHYS PROFIL W/O NST: CPT

## 2018-12-10 NOTE — NURSING NOTE
NST Performed due to BPP 6/8.   reviewed efm tracing. See NST/BPP Doc Flowsheet tab. Patient agrees to come for a BPP on Thursday if undelivered.

## 2018-12-10 NOTE — PROGRESS NOTES
"Please see \"Imaging\" tab under \"Chart Review\" for details of today's US at the [facility].    Mik Cartagena MD  Maternal-Fetal Medicine      "

## 2018-12-13 ENCOUNTER — OFFICE VISIT (OUTPATIENT)
Dept: OBGYN | Facility: CLINIC | Age: 43
End: 2018-12-13
Attending: OBSTETRICS & GYNECOLOGY
Payer: COMMERCIAL

## 2018-12-13 ENCOUNTER — HOSPITAL ENCOUNTER (OUTPATIENT)
Dept: ULTRASOUND IMAGING | Facility: CLINIC | Age: 43
Discharge: HOME | End: 2018-12-13
Attending: OBSTETRICS & GYNECOLOGY | Admitting: OBSTETRICS & GYNECOLOGY
Payer: COMMERCIAL

## 2018-12-13 ENCOUNTER — OFFICE VISIT (OUTPATIENT)
Dept: MATERNAL FETAL MEDICINE | Facility: CLINIC | Age: 43
End: 2018-12-13
Attending: OBSTETRICS & GYNECOLOGY
Payer: COMMERCIAL

## 2018-12-13 VITALS
DIASTOLIC BLOOD PRESSURE: 71 MMHG | HEART RATE: 97 BPM | BODY MASS INDEX: 36.72 KG/M2 | HEIGHT: 61 IN | SYSTOLIC BLOOD PRESSURE: 105 MMHG | WEIGHT: 194.5 LBS

## 2018-12-13 DIAGNOSIS — O09.293 HISTORY OF PRE-ECLAMPSIA IN PRIOR PREGNANCY, CURRENTLY PREGNANT IN THIRD TRIMESTER: ICD-10-CM

## 2018-12-13 DIAGNOSIS — O36.5930 INTRAUTERINE GROWTH RESTRICTION AFFECTING ANTEPARTUM CARE OF MOTHER IN THIRD TRIMESTER, NOT APPLICABLE OR UNSPECIFIED FETUS: ICD-10-CM

## 2018-12-13 DIAGNOSIS — O48.0 POST-TERM PREGNANCY, 40-42 WEEKS OF GESTATION: ICD-10-CM

## 2018-12-13 DIAGNOSIS — O09.523 ELDERLY MULTIGRAVIDA IN THIRD TRIMESTER: ICD-10-CM

## 2018-12-13 DIAGNOSIS — O09.529 SUPERVISION OF HIGH-RISK PREGNANCY OF ELDERLY MULTIGRAVIDA: Primary | ICD-10-CM

## 2018-12-13 DIAGNOSIS — O09.523 ELDERLY MULTIGRAVIDA IN THIRD TRIMESTER: Primary | ICD-10-CM

## 2018-12-13 PROCEDURE — G0463 HOSPITAL OUTPT CLINIC VISIT: HCPCS | Mod: 25

## 2018-12-13 PROCEDURE — 76819 FETAL BIOPHYS PROFIL W/O NST: CPT

## 2018-12-13 PROCEDURE — 76820 UMBILICAL ARTERY ECHO: CPT | Performed by: OBSTETRICS & GYNECOLOGY

## 2018-12-13 ASSESSMENT — MIFFLIN-ST. JEOR: SCORE: 1464.63

## 2018-12-13 NOTE — PROGRESS NOTES
"Please see \"Imaging\" tab under Chart Review for full details.    Lizbeth Cannon MD  Maternal Fetal Medicine    "

## 2018-12-13 NOTE — LETTER
2018       RE: Tricia Tamez   [address]  [facility] [address]     Dear Colleague,    Thank you for referring your patient, Tricia Tamez, to the [facility] at [facility]. Please see a copy of my visit note below.    ROBERTO Visit 18    S: Doing well and via  states she is good with no problems, everything is fine.  Denies ctx, VB or LOF.  + FM.  Denies HA, vision changes, SOB, RUQ pain or worsening swelling.      O:  See OB Flowsheet    A/P: 43 year old  @ 410d presents for ROBERTO visit  1) Prenatal care: Rh positive, Dianne negative, HepBsAg, RPR, HIV NR/Neg, Failed , passed GTT 3 values 86/181/109/49  2) Genetic screening/AMA: Late entry to care at 29 weeks, getting weekly BPP, scheduled this afternoon   3) History of preeclampsia: Normotensive, no signs/symptoms of preeclampsia, had HELLP labs drawn for baseline in 3rd trimester  4) Concern for asymmetric IUGR: Had US 18 with EFW 42%ile but AC 6%ile.  Subsequent MFM scans with growth parameters and EFW c/w overall appropriate for gestational age pattern with appropriate fetal growth , last 18 with EFW 18%ile.  5) Grandmultiparity: Type and screen at delivery. No history of PPH per patient report.  6) Vitamin D deficiency, on daily Vit D  7) S/p TDaP, flu vaccines (10/26/2018)  8) Again discussed today with patient recommendation for IOL as has been done previously.  Continues to declined.  Aware of risk of IUFD, states up to God.  Will need twice weekly BPP until delivery.  Next scheduled this afternoon with MFM.  ROBERTO visit in 1 week if still pregnant.  Labor precautions discussed.    Betsy Sawant MD

## 2018-12-13 NOTE — PROGRESS NOTES
ROBERTO Visit 18    S: Doing well and via  states she is good with no problems, everything is fine.  Denies ctx, VB or LOF.  + FM.  Denies HA, vision changes, SOB, RUQ pain or worsening swelling.      O:  See OB Flowsheet    A/P: 43 year old  @ 410d presents for ROBERTO visit  1) Prenatal care: Rh positive, Dianne negative, HepBsAg, RPR, HIV NR/Neg, Failed , passed GTT 3 values 86/181/109/49  2) Genetic screening/AMA: Late entry to care at 29 weeks, getting weekly BPP, scheduled this afternoon   3) History of preeclampsia: Normotensive, no signs/symptoms of preeclampsia, had HELLP labs drawn for baseline in 3rd trimester  4) Concern for asymmetric IUGR: Had US 18 with EFW 42%ile but AC 6%ile.  Subsequent MFM scans with growth parameters and EFW c/w overall appropriate for gestational age pattern with appropriate fetal growth , last 18 with EFW 18%ile.  5) Grandmultiparity: Type and screen at delivery. No history of PPH per patient report.  6) Vitamin D deficiency, on daily Vit D  7) S/p TDaP, flu vaccines (10/26/2018)  8) Again discussed today with patient recommendation for IOL as has been done previously.  Continues to declined.  Aware of risk of IUFD, states up to God.  Will need twice weekly BPP until delivery.  Next scheduled this afternoon with MFM.  ROBERTO visit in 1 week if still pregnant.  Labor precautions discussed.    Betsy Sawant MD

## 2018-12-20 ENCOUNTER — HOSPITAL ENCOUNTER (INPATIENT)
Facility: CLINIC | Age: 43
LOS: 1 days | Discharge: HOME HEALTH | End: 2018-12-21
Attending: OBSTETRICS & GYNECOLOGY | Admitting: OBSTETRICS & GYNECOLOGY
Payer: COMMERCIAL

## 2018-12-20 ENCOUNTER — OFFICE VISIT (OUTPATIENT)
Dept: INTERPRETER SERVICES | Facility: CLINIC | Age: 43
End: 2018-12-20
Payer: COMMERCIAL

## 2018-12-20 PROBLEM — Z37.9 NORMAL LABOR: Status: ACTIVE | Noted: 2018-12-20

## 2018-12-20 LAB
ABO + RH BLD: NORMAL
ABO + RH BLD: NORMAL
AMPHETAMINES UR QL SCN: NEGATIVE
BASOPHILS # BLD AUTO: 0 10E9/L (ref 0–0.2)
BASOPHILS NFR BLD AUTO: 0.2 %
BLD GP AB SCN SERPL QL: NORMAL
BLOOD BANK CMNT PATIENT-IMP: NORMAL
CANNABINOIDS UR QL: NEGATIVE
COCAINE UR QL: NEGATIVE
DIFFERENTIAL METHOD BLD: ABNORMAL
EOSINOPHIL # BLD AUTO: 0.2 10E9/L (ref 0–0.7)
EOSINOPHIL NFR BLD AUTO: 1.3 %
ERYTHROCYTE [DISTWIDTH] IN BLOOD BY AUTOMATED COUNT: 13 % (ref 10–15)
HCT VFR BLD AUTO: 35.2 % (ref 35–47)
HGB BLD-MCNC: 11.6 G/DL (ref 11.7–15.7)
IMM GRANULOCYTES # BLD: 0.1 10E9/L (ref 0–0.4)
IMM GRANULOCYTES NFR BLD: 0.5 %
LYMPHOCYTES # BLD AUTO: 3.6 10E9/L (ref 0.8–5.3)
LYMPHOCYTES NFR BLD AUTO: 26.5 %
MCH RBC QN AUTO: 29.4 PG (ref 26.5–33)
MCHC RBC AUTO-ENTMCNC: 33 G/DL (ref 31.5–36.5)
MCV RBC AUTO: 89 FL (ref 78–100)
MONOCYTES # BLD AUTO: 0.7 10E9/L (ref 0–1.3)
MONOCYTES NFR BLD AUTO: 4.8 %
NEUTROPHILS # BLD AUTO: 9.2 10E9/L (ref 1.6–8.3)
NEUTROPHILS NFR BLD AUTO: 66.7 %
NRBC # BLD AUTO: 0 10*3/UL
NRBC BLD AUTO-RTO: 0 /100
OPIATES UR QL SCN: NEGATIVE
PCP UR QL SCN: NEGATIVE
PLATELET # BLD AUTO: 260 10E9/L (ref 150–450)
RBC # BLD AUTO: 3.95 10E12/L (ref 3.8–5.2)
SPECIMEN EXP DATE BLD: NORMAL
T PALLIDUM AB SER QL: NONREACTIVE
WBC # BLD AUTO: 13.7 10E9/L (ref 4–11)

## 2018-12-20 PROCEDURE — T1013 SIGN LANG/ORAL INTERPRETER: HCPCS | Mod: U3

## 2018-12-20 PROCEDURE — 25000128 H RX IP 250 OP 636: Performed by: STUDENT IN AN ORGANIZED HEALTH CARE EDUCATION/TRAINING PROGRAM

## 2018-12-20 PROCEDURE — 25000132 ZZH RX MED GY IP 250 OP 250 PS 637: Performed by: ADVANCED PRACTICE MIDWIFE

## 2018-12-20 PROCEDURE — 12000030 ZZH R&B OB INTERMEDIATE UMMC

## 2018-12-20 PROCEDURE — 86850 RBC ANTIBODY SCREEN: CPT | Performed by: STUDENT IN AN ORGANIZED HEALTH CARE EDUCATION/TRAINING PROGRAM

## 2018-12-20 PROCEDURE — 72200001 ZZH LABOR CARE VAGINAL DELIVERY SINGLE

## 2018-12-20 PROCEDURE — 0HQ9XZZ REPAIR PERINEUM SKIN, EXTERNAL APPROACH: ICD-10-PCS | Performed by: OBSTETRICS & GYNECOLOGY

## 2018-12-20 PROCEDURE — 86900 BLOOD TYPING SEROLOGIC ABO: CPT | Performed by: STUDENT IN AN ORGANIZED HEALTH CARE EDUCATION/TRAINING PROGRAM

## 2018-12-20 PROCEDURE — 88307 TISSUE EXAM BY PATHOLOGIST: CPT | Mod: 26 | Performed by: STUDENT IN AN ORGANIZED HEALTH CARE EDUCATION/TRAINING PROGRAM

## 2018-12-20 PROCEDURE — 86901 BLOOD TYPING SEROLOGIC RH(D): CPT | Performed by: STUDENT IN AN ORGANIZED HEALTH CARE EDUCATION/TRAINING PROGRAM

## 2018-12-20 PROCEDURE — 85025 COMPLETE CBC W/AUTO DIFF WBC: CPT | Performed by: STUDENT IN AN ORGANIZED HEALTH CARE EDUCATION/TRAINING PROGRAM

## 2018-12-20 PROCEDURE — 80307 DRUG TEST PRSMV CHEM ANLYZR: CPT | Performed by: STUDENT IN AN ORGANIZED HEALTH CARE EDUCATION/TRAINING PROGRAM

## 2018-12-20 PROCEDURE — 25000125 ZZHC RX 250: Performed by: STUDENT IN AN ORGANIZED HEALTH CARE EDUCATION/TRAINING PROGRAM

## 2018-12-20 PROCEDURE — 10907ZC DRAINAGE OF AMNIOTIC FLUID, THERAPEUTIC FROM PRODUCTS OF CONCEPTION, VIA NATURAL OR ARTIFICIAL OPENING: ICD-10-PCS | Performed by: ADVANCED PRACTICE MIDWIFE

## 2018-12-20 PROCEDURE — 40000977 ZZH STATISTIC ATTENDANCE AT DELIVERY

## 2018-12-20 PROCEDURE — 88307 TISSUE EXAM BY PATHOLOGIST: CPT | Performed by: STUDENT IN AN ORGANIZED HEALTH CARE EDUCATION/TRAINING PROGRAM

## 2018-12-20 PROCEDURE — G0463 HOSPITAL OUTPT CLINIC VISIT: HCPCS

## 2018-12-20 PROCEDURE — 86780 TREPONEMA PALLIDUM: CPT | Performed by: STUDENT IN AN ORGANIZED HEALTH CARE EDUCATION/TRAINING PROGRAM

## 2018-12-20 RX ORDER — NALOXONE HYDROCHLORIDE 0.4 MG/ML
.1-.4 INJECTION, SOLUTION INTRAMUSCULAR; INTRAVENOUS; SUBCUTANEOUS
Status: DISCONTINUED | OUTPATIENT
Start: 2018-12-20 | End: 2018-12-20

## 2018-12-20 RX ORDER — LANOLIN 100 %
OINTMENT (GRAM) TOPICAL
Status: DISCONTINUED | OUTPATIENT
Start: 2018-12-20 | End: 2018-12-21 | Stop reason: HOSPADM

## 2018-12-20 RX ORDER — ONDANSETRON 2 MG/ML
4 INJECTION INTRAMUSCULAR; INTRAVENOUS EVERY 6 HOURS PRN
Status: DISCONTINUED | OUTPATIENT
Start: 2018-12-20 | End: 2018-12-20

## 2018-12-20 RX ORDER — MISOPROSTOL 200 UG/1
400 TABLET ORAL
Status: DISCONTINUED | OUTPATIENT
Start: 2018-12-20 | End: 2018-12-21 | Stop reason: HOSPADM

## 2018-12-20 RX ORDER — OXYTOCIN/0.9 % SODIUM CHLORIDE 30/500 ML
PLASTIC BAG, INJECTION (ML) INTRAVENOUS
Status: DISCONTINUED
Start: 2018-12-20 | End: 2018-12-20 | Stop reason: HOSPADM

## 2018-12-20 RX ORDER — OXYTOCIN/0.9 % SODIUM CHLORIDE 30/500 ML
100 PLASTIC BAG, INJECTION (ML) INTRAVENOUS CONTINUOUS
Status: DISCONTINUED | OUTPATIENT
Start: 2018-12-20 | End: 2018-12-21 | Stop reason: HOSPADM

## 2018-12-20 RX ORDER — MISOPROSTOL 200 UG/1
400 TABLET ORAL ONCE
Status: DISCONTINUED | OUTPATIENT
Start: 2018-12-20 | End: 2018-12-20

## 2018-12-20 RX ORDER — HYDROCORTISONE 2.5 %
CREAM (GRAM) TOPICAL 3 TIMES DAILY PRN
Status: DISCONTINUED | OUTPATIENT
Start: 2018-12-20 | End: 2018-12-21 | Stop reason: HOSPADM

## 2018-12-20 RX ORDER — ACETAMINOPHEN 325 MG/1
650 TABLET ORAL EVERY 4 HOURS PRN
Qty: 50 TABLET | Refills: 0 | Status: SHIPPED | OUTPATIENT
Start: 2018-12-20 | End: 2019-01-19

## 2018-12-20 RX ORDER — OXYTOCIN/0.9 % SODIUM CHLORIDE 30/500 ML
340 PLASTIC BAG, INJECTION (ML) INTRAVENOUS CONTINUOUS PRN
Status: DISCONTINUED | OUTPATIENT
Start: 2018-12-20 | End: 2018-12-21 | Stop reason: HOSPADM

## 2018-12-20 RX ORDER — OXYTOCIN 10 [USP'U]/ML
10 INJECTION, SOLUTION INTRAMUSCULAR; INTRAVENOUS
Status: DISCONTINUED | OUTPATIENT
Start: 2018-12-20 | End: 2018-12-20

## 2018-12-20 RX ORDER — OXYTOCIN/0.9 % SODIUM CHLORIDE 30/500 ML
100-340 PLASTIC BAG, INJECTION (ML) INTRAVENOUS CONTINUOUS PRN
Status: COMPLETED | OUTPATIENT
Start: 2018-12-20 | End: 2018-12-20

## 2018-12-20 RX ORDER — AMOXICILLIN 250 MG
2 CAPSULE ORAL 2 TIMES DAILY
Status: DISCONTINUED | OUTPATIENT
Start: 2018-12-20 | End: 2018-12-21 | Stop reason: HOSPADM

## 2018-12-20 RX ORDER — OXYTOCIN 10 [USP'U]/ML
INJECTION, SOLUTION INTRAMUSCULAR; INTRAVENOUS
Status: DISCONTINUED
Start: 2018-12-20 | End: 2018-12-20 | Stop reason: HOSPADM

## 2018-12-20 RX ORDER — LIDOCAINE HYDROCHLORIDE 10 MG/ML
INJECTION, SOLUTION INFILTRATION; PERINEURAL
Status: DISCONTINUED
Start: 2018-12-20 | End: 2018-12-20 | Stop reason: HOSPADM

## 2018-12-20 RX ORDER — AMOXICILLIN 250 MG
1 CAPSULE ORAL 2 TIMES DAILY
Status: DISCONTINUED | OUTPATIENT
Start: 2018-12-20 | End: 2018-12-21 | Stop reason: HOSPADM

## 2018-12-20 RX ORDER — CARBOPROST TROMETHAMINE 250 UG/ML
250 INJECTION, SOLUTION INTRAMUSCULAR
Status: DISCONTINUED | OUTPATIENT
Start: 2018-12-20 | End: 2018-12-20

## 2018-12-20 RX ORDER — IBUPROFEN 800 MG/1
800 TABLET, FILM COATED ORAL EVERY 6 HOURS PRN
Status: DISCONTINUED | OUTPATIENT
Start: 2018-12-20 | End: 2018-12-21 | Stop reason: HOSPADM

## 2018-12-20 RX ORDER — SODIUM CHLORIDE, SODIUM LACTATE, POTASSIUM CHLORIDE, CALCIUM CHLORIDE 600; 310; 30; 20 MG/100ML; MG/100ML; MG/100ML; MG/100ML
INJECTION, SOLUTION INTRAVENOUS CONTINUOUS
Status: DISCONTINUED | OUTPATIENT
Start: 2018-12-20 | End: 2018-12-20

## 2018-12-20 RX ORDER — CARBOPROST TROMETHAMINE 250 UG/ML
250 INJECTION, SOLUTION INTRAMUSCULAR
Status: DISCONTINUED | OUTPATIENT
Start: 2018-12-20 | End: 2018-12-21 | Stop reason: HOSPADM

## 2018-12-20 RX ORDER — OXYTOCIN 10 [USP'U]/ML
10 INJECTION, SOLUTION INTRAMUSCULAR; INTRAVENOUS
Status: DISCONTINUED | OUTPATIENT
Start: 2018-12-20 | End: 2018-12-21 | Stop reason: HOSPADM

## 2018-12-20 RX ORDER — IBUPROFEN 800 MG/1
800 TABLET, FILM COATED ORAL EVERY 6 HOURS PRN
Qty: 40 TABLET | Refills: 0 | Status: SHIPPED | OUTPATIENT
Start: 2018-12-20 | End: 2019-01-19

## 2018-12-20 RX ORDER — NALOXONE HYDROCHLORIDE 0.4 MG/ML
.1-.4 INJECTION, SOLUTION INTRAMUSCULAR; INTRAVENOUS; SUBCUTANEOUS
Status: DISCONTINUED | OUTPATIENT
Start: 2018-12-20 | End: 2018-12-21 | Stop reason: HOSPADM

## 2018-12-20 RX ORDER — IBUPROFEN 800 MG/1
800 TABLET, FILM COATED ORAL
Status: DISCONTINUED | OUTPATIENT
Start: 2018-12-20 | End: 2018-12-20

## 2018-12-20 RX ORDER — METHYLERGONOVINE MALEATE 0.2 MG/ML
200 INJECTION INTRAVENOUS
Status: DISCONTINUED | OUTPATIENT
Start: 2018-12-20 | End: 2018-12-21 | Stop reason: HOSPADM

## 2018-12-20 RX ORDER — MISOPROSTOL 200 UG/1
TABLET ORAL
Status: DISCONTINUED
Start: 2018-12-20 | End: 2018-12-20 | Stop reason: HOSPADM

## 2018-12-20 RX ORDER — ACETAMINOPHEN 325 MG/1
650 TABLET ORAL EVERY 4 HOURS PRN
Status: DISCONTINUED | OUTPATIENT
Start: 2018-12-20 | End: 2018-12-21 | Stop reason: HOSPADM

## 2018-12-20 RX ORDER — BISACODYL 10 MG
10 SUPPOSITORY, RECTAL RECTAL DAILY PRN
Status: DISCONTINUED | OUTPATIENT
Start: 2018-12-22 | End: 2018-12-21 | Stop reason: HOSPADM

## 2018-12-20 RX ORDER — ACETAMINOPHEN 325 MG/1
650 TABLET ORAL EVERY 4 HOURS PRN
Status: DISCONTINUED | OUTPATIENT
Start: 2018-12-20 | End: 2018-12-20

## 2018-12-20 RX ORDER — OXYCODONE AND ACETAMINOPHEN 5; 325 MG/1; MG/1
1 TABLET ORAL
Status: DISCONTINUED | OUTPATIENT
Start: 2018-12-20 | End: 2018-12-20

## 2018-12-20 RX ORDER — METHYLERGONOVINE MALEATE 0.2 MG/ML
200 INJECTION INTRAVENOUS
Status: DISCONTINUED | OUTPATIENT
Start: 2018-12-20 | End: 2018-12-20

## 2018-12-20 RX ADMIN — SODIUM CHLORIDE, POTASSIUM CHLORIDE, SODIUM LACTATE AND CALCIUM CHLORIDE 500 ML: 600; 310; 30; 20 INJECTION, SOLUTION INTRAVENOUS at 09:21

## 2018-12-20 RX ADMIN — SODIUM CHLORIDE, POTASSIUM CHLORIDE, SODIUM LACTATE AND CALCIUM CHLORIDE: 600; 310; 30; 20 INJECTION, SOLUTION INTRAVENOUS at 09:52

## 2018-12-20 RX ADMIN — MISOPROSTOL 400 MCG: 200 TABLET ORAL at 11:54

## 2018-12-20 RX ADMIN — LIDOCAINE HYDROCHLORIDE 10 ML: 10 INJECTION, SOLUTION INFILTRATION; PERINEURAL at 11:58

## 2018-12-20 RX ADMIN — SODIUM CHLORIDE, POTASSIUM CHLORIDE, SODIUM LACTATE AND CALCIUM CHLORIDE: 600; 310; 30; 20 INJECTION, SOLUTION INTRAVENOUS at 07:45

## 2018-12-20 RX ADMIN — OXYTOCIN-SODIUM CHLORIDE 0.9% IV SOLN 30 UNIT/500ML 340 ML/HR: 30-0.9/5 SOLUTION at 11:52

## 2018-12-20 RX ADMIN — SODIUM CHLORIDE, POTASSIUM CHLORIDE, SODIUM LACTATE AND CALCIUM CHLORIDE 500 ML: 600; 310; 30; 20 INJECTION, SOLUTION INTRAVENOUS at 08:50

## 2018-12-20 NOTE — PROVIDER NOTIFICATION
12/20/18 1045   Provider Notification   Provider Name/Title Dr Shabazz   Method of Notification At Bedside   Notification Reason Status Update   Pt starting to feel pressure with contractions, breathing through contractions q8-10mins. SVE  Per Dr Shabazz, cervix unchanged 4-5cms dilated. Leaking scant mec stained fluid. FHR with baseline 160, minimal variability, no accels, intermittent variable decels. Continues oxygen therapy, freq repositioning, and is s/p 500ml LR bolus.

## 2018-12-20 NOTE — PLAN OF CARE
Patient arrived to [facility] unit via wheelchair at 1410 ,with belongings, accompanied by spouse/ significant other, with infant in arms. Received report from Rika LARSON RN at 1350  and checked bands with transport RN, Ava. Unit and room orientation not done, waiting for  to come . Call light given; no concerns present at this time. Continue with plan of care.

## 2018-12-20 NOTE — PLAN OF CARE
Patient continues to labor spontaneously. Patient breathing through contractions, denies any leakage of fluid or bleeding. Patient denies any needs at this time. Will continue to monitor and update provider with any changes or concerns.

## 2018-12-20 NOTE — DISCHARGE SUMMARY
[facility] Discharge Summary    Tricia Tamez MRN# [id number]   Age: 43 year old YOB: 1975     Date of Admission:  2018  Date of Discharge:  2018  Admitting Physician:  Rika Vu MD  Discharge Physician:  Nivia June MD    Admit Dx:   - Intrauterine pregnancy at 42w0d   - Spontaneous labor  - Advanced maternal age  - Grand multiparity  - Fetal Growth Restriction  - Late prenatal care    Discharge Dx:  - Same as above, s/p     Procedures:  - Spontaneous vaginal delivery    Admit HPI/Labor Course:  Ms. Tricia Tamez is a 43 year old  at 42w0d by 29w6d, admitted for spontaneous labor.  Her pregnancy was otherwise complicated by late PNC, AMA, grand multiparity, and IUGR. AROM was performed for labor augmentation. She progressed along normal labor curve.  She had a normal spontaneous vaginal delivery of a male infant at 1145 on 18. Weight 3030g. Apgars 8 and 9. NICU was present for delivery d/t FGT and meconium stained fluid. Placenta was delivered with gentle downward traction and found to be intact with three vessel cord. She sustained a first degree perineal laceration which was repaired in the normal fashion with 3-0 Vicryl. EBL 400cc.  Mar Lomax was present for the delivery. Instrument and sharp count correct x 2.    Please see her Admission H&P and Delivery Summary for further details.    Postpartum Course:  Her postpartum course was uncomplicated. On PPD#1, she was meeting all of her postpartum goals and deemed stable for discharge. She was voiding without difficulty, tolerating a regular diet without nausea and vomiting, her pain was well controlled on oral pain medicines and her lochia was appropriate. Her hemoglobin prior to delivery was 11.2 and after delivery was 10.5. Her Rh status was positive, and Rhogam was not indicated.     Discharge Medications:   Tricia Tamez   Home Medication Instructions YURI:[id number]     Printed on:12/21/18 0636   Medication Information                      acetaminophen (TYLENOL) 325 MG tablet  Take 2 tablets (650 mg) by mouth every 4 hours as needed for mild pain or fever             Cholecalciferol (VITAMIN D) 2000 UNITS CAPS  Take 1 capsule by mouth daily Take one capsule daily.             ibuprofen (ADVIL/MOTRIN) 800 MG tablet  Take 1 tablet (800 mg) by mouth every 6 hours as needed for other (cramping)             Prenatal Vit-Fe Fumarate-FA (PRENATAL MULTIVITAMIN  PLUS IRON) 27-0.8 MG TABS  Take 1 tablet by mouth daily                 Discharge/Disposition:  Tricia Tamez was discharged to home in stable condition with the following instructions/medications:  1) Call for temperature > 100.4, bright red vaginal bleeding >1 pad an hour x 2 hours, foul smelling vaginal discharge, pain not controlled by usual oral pain meds, persistent nausea and vomiting not controlled on medications  2) She declined contraception.  3) For feeding she decided to breastfeed.  4) She was instructed to follow-up with her primary OB in 6 weeks for a routine postpartum visit.    Matilde Paredes MD   OB/GYN PGY-4     Nivia June MD  12/21/2018

## 2018-12-20 NOTE — H&P
[facility]   LABOR & DELIVERY   HISTORY AND PHYSICAL      Name: Tricia Chaparro  : 1975  MRN: [id number]    Admit Date: 2018    CHIEF COMPLAINT:  Labor    HISTORY OF PRESENT ILLNESS:  Tricia Chaparro is a 43 year old  at 42w0d by 29w6d US.  She presents with contractions.  She denies vaginal bleeding or loss of fluid.  Confirms active fetal movement.  She denies recent headache, fever/chills, nausea/vomiting, chest pain, shortness of breath or any other systemic symptoms.     Pregnancy Complicated By:  - Late entry to prenatal care - established at 29w6d  - AMA  - Grandmultiparity - no history of PPH or shoulder dystocia.   - History of female circumcision  - Failed GCT, passed GTT  - Intrauterine growth restriction: US on 18 - EFW 32%, AC 2%  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/2013, per chart review was treated for 2 months only    OB History:  Obstetric History       T8      L7     SAB0   TAB0   Ectopic0   Multiple0   Live Births8       # Outcome Date GA Lbr [facility]/2nd Weight Sex Delivery Anes PTL Lv   10 Current            9 Term 17 42w3d 07:38 / 00:12 3.62 kg (7 lb 15.7 oz) M   N STEVEN      Name: MARIANELA CHAPARRO      Apgar1:  7                Apgar5: 9   8 Term 05/14/15 38w3d  2.268 kg (5 lb) F Vag-Spont None  STEVEN      Apgar1:  9                Apgar5: 9   7 AB      AB, COMPLETE      6 Term         ND   5 Term         STEVEN   4 Term         STEVEN   3 Term         STEVEN   2 Term         STEVEN   1 Term         STEVEN        Prenatal Lab Results:  Rh: positive  ROSALEE: neg  Hgb: 11.7  Rubella: immune  HBSAg: NR  RPR: NR  HIV: NR  GCT: 159  GTT: 86/181/109/49  GC/Chlam: neg   Pap: NILM, HPV neg 2015  GBS: neg    Prenatal Ultrasounds:  1. 18 @29w6d - normal anatomy, EFW 42%, AC 6%, anterior placenta, 2VC  2.  10/19/18 @33w1d - growth, EFW 35%  3.  11 @36w1d - growth, EFW 18%  4.  11  @39w1d - growth EFW 32%, AC 2%    Problem List:  Patient Active Problem List   Diagnosis     AMA (advanced maternal age) multigravida 35+     High-risk pregnancy     History of pre-eclampsia     Insufficient prenatal care in second trimester     Extreme poverty     Normal labor     HISTORY  Past Medical History:  Denies    Past Surgical History:  Denies    Family Medical History:   Non-contributory    Social History:  Smoking status: denies  EtOH use:  denies  Drugs use: denies    Medications During Pregnancy:  Medications Prior to Admission   Medication Sig Dispense Refill Last Dose     Cholecalciferol (VITAMIN D) 2000 UNITS CAPS Take 1 capsule by mouth daily Take one capsule daily. 90 capsule 3 Taking     Prenatal Vit-Fe Fumarate-FA (PRENATAL MULTIVITAMIN  PLUS IRON) 27-0.8 MG TABS Take 1 tablet by mouth daily 30 tablet 3 Taking     Allergies:  No Known Allergies    REVIEW OF SYSTEMS:  A 10 point review of systems was completed and was negative other than as noted in the HPI.    PHYSICAL EXAM  Vital Signs:   Patient Vitals for the past 24 hrs:   BP Temp Temp src Resp   18 0200 118/71 98.1  F (36.7  C) Oral 18     BMI: There is no height or weight on file to calculate BMI.    General: NAD  Heart: RRR  Lungs: CTAB  Abdomen: gravid, non-tender, non-distended, EFW 7# by Leopold's   Extremities: trace edema    Cervix: /-1    FHT:  Baseline: 140 bpm, moderate variability, accels +, no decels  [place]: 3 ctx q10min    Labs Ordered/Results Pending:  CBC, T&S, RPR    ASSESSMENT:  43 year old  at 42w0d by 29w6d US who presents to L&D for spontaneous labor.  Pregnancy has been complicated by late PNC, AMA, grand multiparity, IUGR.    PLAN:    #Labor: Admit to L&D for labor  - Plan: Expectant management  - SVE: /-1  - GBS status: negative  - Membranes: intact  - Pain management: expectant, doesn't think she'll want any pain medications     #Prenatal Care:  - Rh status: +, Rubella immune, Hep BSAg NR, GCT:  159; GTT 86/181/109/49, Placenta: anterior  - Late entry to care, SW postpartum    #Fetal Well Being:  - IUGR: EFW 32%, AC 2% on last growth US  - FHT Category I, reactive and reassuring  - EFW: 7# by Leopold's  - Cephalic by BSUS  - Continue EFM and [place]    Postpartum Hemorrhage Risk: moderate, multiparous  Postpartum Hemorrhage Plan:  Type & Screen    Discussed with Dr. Milind Lopes MD  OBGYN PGY2  12/20/2018 2:45 AM    I personally examined and evaluated Tricia Tamez on 12/20/2018.  I discussed the patient with Dr. Lopes and agree with the presentation, exam and plan of care documented in this note with edits by me.   Rika Vaz MD MPH

## 2018-12-20 NOTE — PROGRESS NOTES
[facility]  Labor Progress Note    S: Feeling more pressure.    O:   Patient Vitals for the past 4 hrs:   BP Temp Temp src Resp   18 0200 118/71 98.1  F (36.7  C) Oral 18       Gen: NAD  SVE:     FHT: Baseline 145, moderate with periods of minimal variability, no accelerations, no decelerations  [place]: 3 contractions in 10 minutes    A/P:  Ms. Tricia Tamez is a 43 year old  at 42w0d by 29w6d, admitted for spontaneous labor.  Pregnancy otherwise complicated by late PNC, AMA, grand multiparity, IUGR.    #Labor:   - SVE:   - Membranes: intact  - GBS: neg  - Plan: expectant, planning to go without pain medication    #FWB: - Category II FHT, progressing appropriately   - IUGR infant, AC 2%ile    #PNC: - Rh +, Rubella immune, , GTT 86/181/109/49, Placenta anterior, EFW 7#   - Late PNC, SW postpartum    Anticipate     Jannet Lopes MD  OBGYN PGY2  2018 3:47 AM

## 2018-12-20 NOTE — PROGRESS NOTES
[facility]  Labor Progress Note    S: Patient resting in bed. No complaints at this time    Ipad  used    O:   Patient Vitals for the past 4 hrs:   BP Temp Temp src Pulse Resp   18 0830 116/58 98.3  F (36.8  C) Oral 112 20   18 0730 119/59 98.3  F (36.8  C) Oral 106 16   18 0625 134/71 98.6  F (37  C) Oral -- 18       Gen: NAD  SVE: 5-6/80/-1 after AROM, unchanged    FHT: Baseline 155- though period where baseline was 165-170, periods of minimal and moderate variability. Early and intermittent variable decelerations.   [place]: 1-2 contractions in 10 minutes    A/P:  Ms. Tricia Tamez is a 43 year old  at 42w0d by 29w6d, admitted for spontaneous labor.  Pregnancy otherwise complicated by late PNC, AMA, grand multiparity, IUGR.    #Labor:   - SVE: 5-/-1, unchanged.   - Membranes: AROM (815)  - GBS: neg  - Plan: when FHT improves will plan to start IV pitocin to augment contractions. Discussed with patient that pending how labor progresses and fetal status, could potentially result in  section. Given patient's grand multiparity, hopeful for vaginal delivery.     #FWB:   - Category II due to periods of baseline elevated to 165, though now 155. Also periods of of minimal, though now of moderate variability. Position changes, maternal oxygen, as well as position changes for fetal resuscitation. Will start IV Pitocin when FHT improves.   - IUGR infant, AC 2%ile    #PNC:   - Rh +, Rubella immune, , GTT 86/181/109/49, Placenta anterior, EFW 7#  - Late PNC, SW postpartum    Anticipate     Deana Wang MD   OB/GYN Resident PGY-2  2018 10:24 AM    [facility] staff:  Appreciate note by Dr. Wang.  I have seen and examined the patient without the resident. I have reviewed, edited, and agree with the note.        Marisa Shabazz MD, FACOG  2018  1:02 PM

## 2018-12-20 NOTE — PROGRESS NOTES
[facility]  Labor Progress Note    S: Breathing through contractions.  Declines AROM.     O:   Patient Vitals for the past 4 hrs:   BP Temp Temp src Resp   18 0415 131/77 98.7  F (37.1  C) Oral 20   18 0200 118/71 98.1  F (36.7  C) Oral 18       Gen: NAD  SVE: -    FHT: Baseline 145, moderate with periods of minimal variability, no accelerations, no decelerations  [place]: 3 contractions in 10 minutes    A/P:  Ms. Tricia Tamez is a 43 year old  at 42w0d by 29w6d, admitted for spontaneous labor.  Pregnancy otherwise complicated by late PNC, AMA, grand multiparity, IUGR.    #Labor:   - SVE:   - Membranes: intact  - GBS: neg  - Plan: expectant, planning to go without pain medication    #FWB:   - Category II due to periods of minimal, but overall reactive and reassuring. Discussed we would recommend AROM again if persistent Cat II with minimal progress.   - IUGR infant, AC 2%ile    #PNC:   - Rh +, Rubella immune, , GTT 86/181/109/49, Placenta anterior, EFW 7#  - Late PNC, SW postpartum    Anticipate     Jannet Lopes MD  OBGYN PGY2  2018 4:57 AM

## 2018-12-20 NOTE — PROVIDER NOTIFICATION
12/20/18 0339   Provider Notification   Provider Name/Title Dr. Lopes G2   Method of Notification Electronic Page   Request Evaluate - Remote   Notification Reason Status Update     Notified provider that patient is feeling pressure with contractions.

## 2018-12-20 NOTE — PROVIDER NOTIFICATION
12/20/18 0757   Provider Notification   Provider Name/Title GIOVANNI Bruno   Method of Notification In Department   Request Evaluate in Person   Notification Reason Variability Change   Contractions have spaced to q8-9 minutes, palpate moderate. FHR with minimal variability, normal baseline, intermittent late decels without accelerations. Started fluid bolus and repositioned. Awaiting Oromo in person nterpreter.

## 2018-12-20 NOTE — PLAN OF CARE
Pt stable in immediate post-partum period. Had QBL-582mls and received oral miso and IV pitocin per protocol. First degree lac repaired by Dr Shabazz. Vitals stable, mild tachycardia.  independently. Ate light diet. Assisted up to void at 1330 without difficulty. Telephone report to Aziza Mendoza at 1345.

## 2018-12-20 NOTE — L&D DELIVERY NOTE
Delivery Summary  DELIVERY NOTE:  Brief Labor Course: pregnancy complicated by: postdates, late PNC, AMA, grandmultiparity, asymmetric IUGR. pt arrived in spontaneous labor, Augmented with AROM thick mec for no cervical change. Progressed quickly to complete without pain medications  Delivery Note:    per Ipad  Called to room for delivery as MD team was in the OR. Pt was complete and infant crowned with one push. NICU called for delivery for hx of asymmetric IUGR and mec fluid.  viable male with nuchal hand and tight CAN, somersault out. spont cry and placed on moms abd. IV with pitocin opened after delivery of baby, spont araceli placenta delivered intact with 3-v cord. Sent to pathology for hx of asymmetric IUGR. Continued with trickle of free flow, oral misoprostol given. 1st degree laceration noted and continued oozing. Pt agreeable to repair. Dr Shabazz arrived to complete laceration repair and assume cares of pt.  IUP at 42 weeks gestation delivered on 2018.     delivery of a viable Male infant.  Weight : pending  Apgars of 8 at 1 minute and 9 at 5 minutes.  Labor was augmented.  Medications administered  in labor:  Pain Rx None; Antibiotics No; Other   Perineum: 1st degree  Placenta-mechanism: spontaneous, intact,  with a 3 vessel cord. Placenta was sent to Pathology. IV oxytocin was given After delivery of baby, oral misoprostol given.  Quantitative Blood Loss was pending. EBL 400cccc.  Complications of labor and delivery: Category two FHT tracing, Dysfunctional Labor, Meconium stained fluid, Nuchal cord and Nuchal hand  Anticipated Discharge Date: 18  Birth attendants: Mar Lomax CNM, Dr MARSHA Lomax, ELINA FLORENCE    Tricia Tamez MRN# [id number]   Age: 43 year old YOB: 1975     ASSESSMENT & PLAN:        Labor Event Times     Onset time:   8:15 AM      Labor Events     labor?:  No  Labor Type:  Spontaneous, Augmentation,  AROM     Antibiotics received during labor?:  No     Rupture identifier:  Rupture 1  Rupture date/time:     Rupture type:  Artificial Rupture of Membranes  Fluid color:  Meconium  Fluid odor:  Normal     Augmentation:  AROM  Indications for augmentation:  Ineffective Contraction Pattern     Delivery/Placenta Date and Time    Delivery Date:  18 Delivery Time:  11:45 AM   Oxytocin given at the time of delivery:  after delivery of baby     Vaginal Counts     Initial count performed by 2 team members:   Two Team Members   doreen lomax cnm       Needles Suture [place] Sponges Instruments   Initial counts 2  5    Added to count  1     Final counts 2 1 5    Placed during labor Accounted for at the end of labor   NA    NA    NA            Apgars    Living status:  Living   1 Minute 5 Minute 10 Minute 15 Minute 20 Minute   Skin color: 1  1       Heart rate: 2  2       Reflex irritability: 2  2       Muscle tone: 1  2       Respiratory effort: 2  2       Total: 8  9       Apgars assigned by:  DUGLAS ANTOINE CNP     Cord    Vessels:  3 Vessels Complications:  Nuchal   Cord Blood Disposition:  Lab Gases Sent?:  No      [place] Resuscitation    Methods:  None   Care at Delivery:  NNP Delivery Note    Asked by Cipriano Lomax CNM to attend the delivery of this postterm, male infant with a gestational age of 42 weeks secondary to meconium stained fluid.      Infant delivered at 1145 hours on 2018. Infant had spontaneous respirations at birth. He was placed on mother's chest, dried, stimulated, and bulb suctioned at birth. Apgars were 8 at one minute and 8 at five minutes of age. Gross PE is WNL. Infant continued to be active with a loud lusty cry. Lung sounds coarse without grunting. Infant was left in the care of to mother and father.    Duglas ANTOINE CNP 2018 12:03PM     Labor Events and Shoulder Dystocia    Fetal Tracing Prior to Delivery:  Category 2  Shoulder dystocia present?:  Neg      Delivery (Maternal) (Provider to Complete) (507657)    Episiotomy:  None  Perineal lacerations:  1st Repaired?:  Yes   Vaginal laceration?:  No    Cervical laceration?:  No       Blood Loss  Mother: Tricia Tamez #[id number]   Start of Mother's Information    IO Blood Loss  12/19/18 2345 - 12/20/18 1215    None           End of Mother's Information  Mother: Tricia Tamez #[id number]         Delivery - Provider to Complete (117064)    Delivering clinician:  Mar Lomax APRN CNM  CNM Care:  Any CNM care in labor  Attempted Delivery Types (Choose all that apply):  Spontaneous Vaginal Delivery  Delivery Type (Choose the 1 that will go to the Birth History):  Vaginal, Spontaneous   Other personnel:   Provider Role   Marisa Shabazz MD          Placenta    Delayed Cord Clamping:  Done  Removal:  Spontaneous  Disposition:  Pathology     Anesthesia    Method:  None          Presentation and Position    Presentation:  Vertex   Occiput Anterior           ELINA Madrigal CNM

## 2018-12-20 NOTE — PROGRESS NOTES
[facility]  Labor Progress Note    S: Patient appears little uncomfortable. Discussed concerns regarding FHT and minimal progression. Per , patient and  are concerned that AROM is painful. After explaining the process, patient agreed to AROM.     Ipad  used    O:   Patient Vitals for the past 4 hrs:   BP Temp Temp src Resp   18 0730 119/59 98.3  F (36.8  C) Oral 16   18 0625 134/71 98.6  F (37  C) Oral 18       Gen: NAD  SVE: 5-680/-1 after AROM     FHT: Baseline 135, moderate variability after AROM, no accelerations, 1 early deceleration  [place]: 2 contractions in 10 minutes    A/P:  Ms. Tricia Tamez is a 43 year old  at 42w0d by 29w6d, admitted for spontaneous labor.  Pregnancy otherwise complicated by late PNC, AMA, grand multiparity, IUGR.    #Labor:   - SVE: 5-80/-1   - Membranes: AROM (815)  - GBS: neg  - Plan: expectant, planning to go without pain medication.     #FWB:   - Category II due to periods of minimal, periods of moderate variability which are reassuring. Discussed we recommend AROM again given the minimal progression. Patient eventually agreed to AROM. S/p AROM, category I with moderate variability.   - IUGR infant, AC 2%ile    #PNC:   - Rh +, Rubella immune, , GTT 86/181/109/49, Placenta anterior, EFW 7#  - Late PNC, SW postpartum    Anticipate       Stephen Price, MS3  2018 8:19 AM    I was present with the medical student who participated in the service and in the documentation of this note.  I have verified the history and personally performed the physical exam and medical decision making, and have verified the content of the note, which accurately reflect my assessment of the patient and the plan of care.    Deana Wang MD   OB/GYN Resident PGY-2  2018 9:17 AM

## 2018-12-20 NOTE — PLAN OF CARE
Data: Patient presented to [facility] at 0144.   Reason for maternal/fetal assessment per patient is Rule Out Labor  .  Patient is a . Prenatal record reviewed.      Obstetric History       T8      L7     SAB0   TAB0   Ectopic0   Multiple0   Live Births8       # Outcome Date GA Lbr Michael/2nd Weight Sex Delivery Anes PTL Lv   10 Current            9 Term 17 42w3d 07:38 / 00:12 3.62 kg (7 lb 15.7 oz) M   N STEVEN      Name: MARIANELA CHAPARRO      Apgar1:  7                Apgar5: 9   8 Term 05/14/15 38w3d  2.268 kg (5 lb) F Vag-Spont None  STEVEN      Apgar1:  9                Apgar5: 9   7 AB      AB, COMPLETE      6 Term         ND   5 Term         STEVEN   4 Term         STEVEN   3 Term         STEVEN   2 Term         STEVEN   1 Term         STEVEN      . Medical history: History reviewed. No pertinent past medical history.. Gestational Age 42w0d. VSS. Fetal movement present. Patient denies backache, vaginal discharge, pelvic pressure, UTI symptoms, GI problems, bloody show, vaginal bleeding, edema, headache, visual disturbances, epigastric or URQ pain, rupture of membranes. Support persons are-  and son- present.  Action: Verbal consent for EFM. Triage assessment completed. EFM applied for fetal assessment in labor, post dates gestation. Uterine assessment done-see doc flow. Fetal assessment: Presumed adequate fetal oxygenation documented (see flow record).   Response: Dr. Lopes (G2) informed of arrival. Plan per provider is admit to labor, anticipate , comfort measures per Pt request. Patient verbalized agreement with plan. Patient transferred to room 456 ambulatory, oriented to room and call light. Report given to SUGAR Betancourt RN.

## 2018-12-20 NOTE — PROVIDER NOTIFICATION
12/20/18 1136   Provider Notification   Provider Name/Title Dr Shabazz   Method of Notification In Department  (in OR)   Request Attend Delivery   Notification Reason SVE;Status Update   At 1130 pt suddenly started feeling increased rectal pressure, ctxs had increased from q8-10min to q4-5 minutes. SVE per writer 10/100/0, Dr Shabazz in OR, notified by charge nurse of imminent delivery. Cipriano Lomax paged to attend delivery.

## 2018-12-20 NOTE — PROVIDER NOTIFICATION
12/20/18 0450   Provider Notification   Provider Name/Title Dr. Lopes G2   Method of Notification At Bedside;In Department   Request Evaluate in Person   Notification Reason Variability Change;Status Update     Notified provider that FHR strip is having periods of minimal variability. Provider to bedside to check in with patient. SVE preformed due to patient appearing to be pushing. SVE was 7/80/-1     Will continue to monitor and update provider with any changes or concerns.

## 2018-12-20 NOTE — PROVIDER NOTIFICATION
12/20/18 0922   Provider Notification   Provider Name/Title GIOVANNI Wang   Method of Notification In Department   Request Evaluate in Person   Notification Reason Fetal Baseline Change   Fetal baseline change to 160-170bpm with early and variable decels. Contractions q 8-10 minutes since AROM one hour ago. Mild maternal tachycardia since time of admission and no fever. Second 500ml fluid bolus now. Will discuss pitocin augmentation with ipad .

## 2018-12-20 NOTE — PROVIDER NOTIFICATION
12/20/18 0811   Provider Notification   Provider Name/Title GIOVANNI Wang   Method of Notification At Bedside   Request Evaluate in Person   Notification Reason SVE   GIOVANNI Wang at bedside to consent pt to AROM. Pt agrees to AROM with use of Oromo ipad  (ZENT #[id number]). Mec stained fluid at 0811, cervix unchanged. NICU updated. Pt declines pain meds for labor discomfort.  Continue IV fluids, O2 therapy, frequent repositioning, and close monitoring.

## 2018-12-21 VITALS
RESPIRATION RATE: 16 BRPM | HEART RATE: 89 BPM | DIASTOLIC BLOOD PRESSURE: 69 MMHG | SYSTOLIC BLOOD PRESSURE: 106 MMHG | TEMPERATURE: 98.6 F | OXYGEN SATURATION: 98 %

## 2018-12-21 LAB — HGB BLD-MCNC: 10.5 G/DL (ref 11.7–15.7)

## 2018-12-21 PROCEDURE — 85018 HEMOGLOBIN: CPT | Performed by: STUDENT IN AN ORGANIZED HEALTH CARE EDUCATION/TRAINING PROGRAM

## 2018-12-21 PROCEDURE — 36415 COLL VENOUS BLD VENIPUNCTURE: CPT | Performed by: STUDENT IN AN ORGANIZED HEALTH CARE EDUCATION/TRAINING PROGRAM

## 2018-12-21 PROCEDURE — 25000132 ZZH RX MED GY IP 250 OP 250 PS 637: Performed by: STUDENT IN AN ORGANIZED HEALTH CARE EDUCATION/TRAINING PROGRAM

## 2018-12-21 RX ADMIN — SENNOSIDES AND DOCUSATE SODIUM 1 TABLET: 8.6; 5 TABLET ORAL at 08:00

## 2018-12-21 NOTE — PLAN OF CARE
Discharged at 1734 per wheelchair, accompanied to automobile by NST. Discharge instructions reviewed with Oromo  via phone. She denies any further concerns/questions, and will follow up with her provider.

## 2018-12-21 NOTE — PROGRESS NOTES
"Post Partum Progress Note    Subjective:  Ms. Tamez is doing well this morning. She denies pain. Her bleeding is \"normal.\" She is tolerating oral intake, voiding spontaneously, and ambulating without dizziness. She is breastfeeding and declines birth control. She wants to go home today.    Patient see with iPad Oromo     Objective:  Patient Vitals for the past 24 hrs:   BP Temp Temp src Pulse Heart Rate Resp SpO2   18 0108 106/65 98.5  F (36.9  C) Oral -- 91 16 --   18 1550 121/61 99  F (37.2  C) Oral 89 -- 16 --   18 1409 110/61 -- -- -- -- 18 98 %   18 1350 104/58 99  F (37.2  C) Oral -- -- 18 --   18 1333 126/58 -- -- -- -- 20 --   18 1316 115/58 -- -- -- -- -- --   18 1301 120/59 -- -- -- -- 18 --   18 1236 126/59 97.8  F (36.6  C) Oral -- -- 18 --   18 1214 134/58 -- -- -- -- -- --   18 1153 135/62 -- -- -- -- 20 --   18 1100 109/58 98.8  F (37.1  C) Oral -- -- 20 --   18 0830 116/58 98.3  F (36.8  C) Oral 112 -- 20 --   18 0730 119/59 98.3  F (36.8  C) Oral 106 -- 16 --       General: AAOx3, NAD, appears generally well  Resp:  Breathing comfortably on room air  Abd:  Soft, nontender, nondistended, fundus firm below the umbilicus  Ext:  no edema in bilateral LE      Assessment and Plan:  43 year old old  post-partum day 1 s/p . She is doing well in the postpartum period.     - Pain: Tylenol, ibuprofen  - Heme: Hgb 11.6 >  > AM pending  - GI: Regular diet. Bowel regimen. Antiemetics PRN.  - : voiding spontaneously  - Rh positive, Rubella immune  - Breast feeding  - Declines birth control    Anticipate discharge to home today    Zeenat Lopes, PGY3  OB/GYN Resident  2018       [facility] staff:  Appreciate note by Dr. Lopes.  I have seen and examined the patient without the resident. I have reviewed, edited, and agree with the note.    My findings are:  Patient seen with Oromo " professional  on iPad. Patient reports feeling well and feeling ready for discharge to home today. No other questions or concerns.   Nivia June MD  12/21/2018

## 2018-12-21 NOTE — PLAN OF CARE
VSS. Postpartum checks WDL. Up independently, voiding without difficulty. Denies pain, declines ibuprofen or tylenol. Breastfeeding independently, encouraged deeper latch.

## 2018-12-21 NOTE — PLAN OF CARE
Stable postpartum and denies pain. Independent with self and baby cares. Breastfeeding  without issue.  present this morning.

## 2018-12-21 NOTE — PROVIDER NOTIFICATION
12/21/18 1550   Provider Notification   Provider Name/Title Dr Wang   Method of Notification Electronic Page   Request Evaluate-Remote   Notification Reason Other  (need discharge order please)

## 2018-12-26 ENCOUNTER — DOCUMENTATION ONLY (OUTPATIENT)
Dept: OBGYN | Facility: CLINIC | Age: 43
End: 2018-12-26

## 2018-12-26 NOTE — PROGRESS NOTES
[facility] will be sharing updates with you on Maternal Child Health Referral requests for home care services.  This is for care coordination purposes and alert you to referral status.  We received the referral for  Tricia Tamez; MRN [id number] and want to update you:    [facility] has made 2 attempts to contact patient by phone and text message over the last 4 days.   We have not had any response from patient.  Final message was left advising patient to follow up with Primary Care Providers for mom and baby.     Sincerely [facility]  Drew Dias  [phone]

## 2018-12-27 LAB — COPATH REPORT: NORMAL

## 2022-01-19 NOTE — MR AVS SNAPSHOT
"              After Visit Summary   10/19/2018    Tricia Tamez    MRN: [id number]           Patient Information     Date Of Birth          1975        Visit Information        Provider Department      10/19/2018 12:15 PM Mik Cartagena MD [facility] Maternal Fetal Medicine [facility]        Today's Diagnoses     IUGR (intrauterine growth retardation) in prior pregnancy, pregnant, third trimester    -  1    Elderly multigravida in third trimester           Follow-ups after your visit        Future tests that were ordered for you today     Open Future Orders        Priority Expected Expires Ordered    Maternal Fetal US Comprehensive Sngle FU Routine 11/9/2018 10/19/2019 10/19/2018    Maternal Fetal BPP Single Routine 11/16/2018 10/19/2019 10/19/2018    Maternal Fetal BPP Single Routine 11/23/2018 10/19/2019 10/19/2018    Maternal Fetal BPP Single Routine 11/30/2018 10/19/2019 10/19/2018            Who to contact     If you have questions or need follow up information about today's clinic visit or your schedule please contact [facility] MATERNAL FETAL MEDICINE [facility] directly at [phone].  Normal or non-critical lab and imaging results will be communicated to you by [employer], letter or phone within 4 business days after the clinic has received the results. If you do not hear from us within 7 days, please contact the clinic through [employer] or phone. If you have a critical or abnormal lab result, we will notify you by phone as soon as possible.  Submit refill requests through [address] or call your pharmacy and they will forward the refill request to us. Please allow 3 business days for your refill to be completed.          Additional Information About Your Visit        [facility]ch[employer]ouphar[employer]ise Information     [address] lets you send messages to your doctor, view your test results, renew your prescriptions, schedule appointments and more. To sign up, go to [email or web address] . Click on \"Log in\" on the left side of the " "screen, which will take you to the Welcome page. Then click on \"Sign up Now\" on the right side of the page.     You will be asked to enter the access code listed below, as well as some personal information. Please follow the directions to create your username and password.     Your access code is: [email or web address]  Expires: 2018 12:10 PM     Your access code will  in 90 days. If you need help or a new code, please call your [facility] or [phone].        Care EveryWhere ID     This is your Care EveryWhere ID. This could be used by other organizations to access your [place] medical records  [id number]        Your Vitals Were     Last Period                   (LMP Unknown)            Blood Pressure from Last 3 Encounters:   17 126/75   17 119/79   04/10/17 123/80    Weight from Last 3 Encounters:   17 82.6 kg (182 lb)   04/10/17 82.6 kg (182 lb)   17 82.6 kg (182 lb)               Primary Care Provider Office Phone # Fax #    Lilly Cartwright [phone]       [facility] [address]  [facility] [address]        Equal Access to Services     PATRICE GOMEZ : Hadii maryann ku hadasho Solaniali, waaxda luqadaha, qaybta kaalmada adeegyada, hubert yin. So [facility] [phone].    ATENCIÓN: Si habla español, tiene a lozada disposición servicios gratuitos de asistencia lingüística. Llsaji al [phone].    We comply with applicable federal civil rights laws and [place] laws. We do not discriminate on the basis of race, color, national origin, age, disability, sex, sexual orientation, or gender identity.            Thank you!     Thank you for choosing [facility]  for your care. Our goal is always to provide you with excellent care. Hearing back from our patients is one way we can continue to improve our services. Please take a few minutes to complete the written survey that you may receive in the mail after " your visit with us. Thank you!             Your Updated Medication List - Protect others around you: Learn how to safely use, store and throw away your medicines at www.disposemymeds.org.          This list is accurate as of 10/19/18 12:33 PM.  Always use your most recent med list.                   Brand Name Dispense Instructions for use Diagnosis    prenatal multivitamin plus iron 27-0.8 MG Tabs per tablet     30 tablet    Take 1 tablet by mouth daily    HRP (high risk pregnancy), third trimester       vitamin D 2000 units Caps     90 capsule    Take 1 capsule by mouth daily Take one capsule daily.    Hypovitaminosis D          Yes

## 2022-08-29 NOTE — PLAN OF CARE
Problem: Goal Outcome Summary  Goal: Goal Outcome Summary  Outcome: Improving  Data: Vital signs within normal limits. Postpartum checks within normal limits - see flow record. Patient eating and drinking normally. Patient able to empty bladder independently and is up ambulating. No apparent signs of infection. perinium healing well. Patient performing self cares and is able to care for infant.  Action: Patient declined having pain or need for medication,  patient stated she was comfortable. Patient education done about Self and infant cares. See flow record.  Response: Positive attachment behaviors observed with infant. Support persons family present.   Plan: Anticipate discharge on 4/18.      
Problem: Goal Outcome Summary  Goal: Goal Outcome Summary  Outcome: Improving  Mother and baby transferred to postpartum unit at 0455 via wheelchair with baby in arms after completion of immediate recovery period. Patient oriented to room and report given to Irena LARSON RN who assumes patient care. Mother and baby bonding well and in stable condition upon transfer.      
Problem: Goal Outcome Summary  Goal: Goal Outcome Summary  Outcome: Improving  Pt transferred up to unit at approx 0500. VSS. Orientated to room and call light. Fundus firm, midline and U/1. Pt able to move independently. Older son and FOB present. Declining offered pain medication. Will continue to monitor.      
Problem: Goal Outcome Summary  Goal: Goal Outcome Summary  Outcome: Improving  Vitals are stable, breastfeeding baby on demand, voiding without difficulty. Going home today.      
Problem: Goal Outcome Summary  Goal: Goal Outcome Summary  Outcome: Improving  Vitals are stable, breastfeeding baby on demand, wanted formula but after educated the risk of formula and the benefits of exclusive breastfeeding she decided to wait, voiding without difficulty, denies pain and declined pain medications. Continue with plan of care.      
Problem: Labor (Cervical Ripen, Induct, Augment) (Adult,Obstetrics,Pediatric)  Goal: Signs and Symptoms of Listed Potential Problems Will be Absent or Manageable (Labor)  Signs and symptoms of listed potential problems will be absent or manageable by discharge/transition of care (reference Labor (Cervical Ripen, Induct, Augment) (Adult,Obstetrics,Pediatric) CPG).   Outcome: Improving  Vaginal Delivery Note   of viable Male with Dr. Paul & Dr. Shabazz in attendance.  NICU present due to FHR late decels.  Infant with spontaneous cry, to mother's abdomen, dried and stimulated.  APGAR at 1 minute:  7 and APGAR at 5 minutes:  9.  Placenta delivered with out complication, IM pitocin and rectal miso given, 1st deree laceration, with repair, summer cares provided.  Mother and baby in stable condition.  Initial QBL unable to be completed due to presence of large unknown amount of amniotic fluid.  ml plus weighed pads for partial QBL of 112 ml [total  ml].    
Problem: Labor (Cervical Ripen, Induct, Augment) (Adult,Obstetrics,Pediatric)  Goal: Signs and Symptoms of Listed Potential Problems Will be Absent or Manageable (Labor)  Signs and symptoms of listed potential problems will be absent or manageable by discharge/transition of care (reference Labor (Cervical Ripen, Induct, Augment) (Adult,Obstetrics,Pediatric) CPG).  Outcome: Improving  Pt arrived to Triage for rule out rupture. C/o ctx, ROM. SVE 5.5/50/0. Dr. Lundberg at bedside for u/s - confirmed cephalic.           
Referral made to [facility] for early DC.  
5

## 2023-07-08 ENCOUNTER — HOSPITAL ENCOUNTER (EMERGENCY)
Facility: CLINIC | Age: 48
Discharge: HOME | End: 2023-07-08
Attending: EMERGENCY MEDICINE | Admitting: EMERGENCY MEDICINE
Payer: COMMERCIAL

## 2023-07-08 ENCOUNTER — APPOINTMENT (OUTPATIENT)
Dept: CT IMAGING | Facility: CLINIC | Age: 48
End: 2023-07-08
Attending: NURSE PRACTITIONER
Payer: COMMERCIAL

## 2023-07-08 VITALS
DIASTOLIC BLOOD PRESSURE: 91 MMHG | RESPIRATION RATE: 16 BRPM | TEMPERATURE: 98.7 F | SYSTOLIC BLOOD PRESSURE: 134 MMHG | WEIGHT: 193 LBS | HEIGHT: 65 IN | HEART RATE: 89 BPM | OXYGEN SATURATION: 98 % | BODY MASS INDEX: 32.15 KG/M2

## 2023-07-08 DIAGNOSIS — K52.9 COLITIS: ICD-10-CM

## 2023-07-08 LAB
ALBUMIN SERPL BCG-MCNC: 4.5 G/DL (ref 3.5–5.2)
ALBUMIN UR-MCNC: NEGATIVE MG/DL
ALP SERPL-CCNC: 90 U/L (ref 35–104)
ALT SERPL W P-5'-P-CCNC: 30 U/L (ref 0–50)
ANION GAP SERPL CALCULATED.3IONS-SCNC: 12 MMOL/L (ref 7–15)
APPEARANCE UR: CLEAR
AST SERPL W P-5'-P-CCNC: 19 U/L (ref 0–45)
BASOPHILS # BLD AUTO: 0.1 10E3/UL (ref 0–0.2)
BASOPHILS NFR BLD AUTO: 0 %
BILIRUB SERPL-MCNC: 0.3 MG/DL
BILIRUB UR QL STRIP: NEGATIVE
BUN SERPL-MCNC: 11.8 MG/DL (ref 6–20)
CALCIUM SERPL-MCNC: 10.1 MG/DL (ref 8.6–10)
CHLORIDE SERPL-SCNC: 96 MMOL/L (ref 98–107)
COLOR UR AUTO: YELLOW
CREAT SERPL-MCNC: 0.5 MG/DL (ref 0.51–0.95)
DEPRECATED HCO3 PLAS-SCNC: 24 MMOL/L (ref 22–29)
EOSINOPHIL # BLD AUTO: 0.1 10E3/UL (ref 0–0.7)
EOSINOPHIL NFR BLD AUTO: 1 %
ERYTHROCYTE [DISTWIDTH] IN BLOOD BY AUTOMATED COUNT: 12.1 % (ref 10–15)
GFR SERPL CREATININE-BSD FRML MDRD: >90 ML/MIN/1.73M2
GLUCOSE SERPL-MCNC: 286 MG/DL (ref 70–99)
GLUCOSE UR STRIP-MCNC: >=1000 MG/DL
HCG UR QL: NEGATIVE
HCT VFR BLD AUTO: 42.8 % (ref 35–47)
HGB BLD-MCNC: 14.8 G/DL (ref 11.7–15.7)
HGB UR QL STRIP: NEGATIVE
IMM GRANULOCYTES # BLD: 0 10E3/UL
IMM GRANULOCYTES NFR BLD: 0 %
KETONES UR STRIP-MCNC: 20 MG/DL
LEUKOCYTE ESTERASE UR QL STRIP: NEGATIVE
LIPASE SERPL-CCNC: 26 U/L (ref 13–60)
LYMPHOCYTES # BLD AUTO: 2.2 10E3/UL (ref 0.8–5.3)
LYMPHOCYTES NFR BLD AUTO: 17 %
MCH RBC QN AUTO: 30 PG (ref 26.5–33)
MCHC RBC AUTO-ENTMCNC: 34.6 G/DL (ref 31.5–36.5)
MCV RBC AUTO: 87 FL (ref 78–100)
MONOCYTES # BLD AUTO: 0.6 10E3/UL (ref 0–1.3)
MONOCYTES NFR BLD AUTO: 4 %
MUCOUS THREADS #/AREA URNS LPF: PRESENT /LPF
NEUTROPHILS # BLD AUTO: 10.4 10E3/UL (ref 1.6–8.3)
NEUTROPHILS NFR BLD AUTO: 78 %
NITRATE UR QL: NEGATIVE
NRBC # BLD AUTO: 0 10E3/UL
NRBC BLD AUTO-RTO: 0 /100
PH UR STRIP: 5 [PH] (ref 5–7)
PLATELET # BLD AUTO: 300 10E3/UL (ref 150–450)
POTASSIUM SERPL-SCNC: 4.2 MMOL/L (ref 3.4–5.3)
PROT SERPL-MCNC: 7.9 G/DL (ref 6.4–8.3)
RBC # BLD AUTO: 4.94 10E6/UL (ref 3.8–5.2)
RBC URINE: <1 /HPF
SODIUM SERPL-SCNC: 132 MMOL/L (ref 136–145)
SP GR UR STRIP: 1.02 (ref 1–1.03)
SQUAMOUS EPITHELIAL: 1 /HPF
UROBILINOGEN UR STRIP-MCNC: NORMAL MG/DL
WBC # BLD AUTO: 13.4 10E3/UL (ref 4–11)
WBC URINE: 3 /HPF

## 2023-07-08 PROCEDURE — 250N000011 HC RX IP 250 OP 636: Mod: JZ | Performed by: EMERGENCY MEDICINE

## 2023-07-08 PROCEDURE — 74177 CT ABD & PELVIS W/CONTRAST: CPT

## 2023-07-08 PROCEDURE — 85025 COMPLETE CBC W/AUTO DIFF WBC: CPT | Performed by: EMERGENCY MEDICINE

## 2023-07-08 PROCEDURE — 80053 COMPREHEN METABOLIC PANEL: CPT | Performed by: EMERGENCY MEDICINE

## 2023-07-08 PROCEDURE — 96376 TX/PRO/DX INJ SAME DRUG ADON: CPT | Performed by: EMERGENCY MEDICINE

## 2023-07-08 PROCEDURE — 99285 EMERGENCY DEPT VISIT HI MDM: CPT | Mod: 25 | Performed by: EMERGENCY MEDICINE

## 2023-07-08 PROCEDURE — 258N000003 HC RX IP 258 OP 636: Performed by: EMERGENCY MEDICINE

## 2023-07-08 PROCEDURE — 99285 EMERGENCY DEPT VISIT HI MDM: CPT | Performed by: EMERGENCY MEDICINE

## 2023-07-08 PROCEDURE — 96361 HYDRATE IV INFUSION ADD-ON: CPT | Performed by: EMERGENCY MEDICINE

## 2023-07-08 PROCEDURE — 250N000011 HC RX IP 250 OP 636: Mod: JZ | Performed by: NURSE PRACTITIONER

## 2023-07-08 PROCEDURE — 250N000009 HC RX 250: Performed by: EMERGENCY MEDICINE

## 2023-07-08 PROCEDURE — 81001 URINALYSIS AUTO W/SCOPE: CPT | Performed by: EMERGENCY MEDICINE

## 2023-07-08 PROCEDURE — 96375 TX/PRO/DX INJ NEW DRUG ADDON: CPT | Performed by: EMERGENCY MEDICINE

## 2023-07-08 PROCEDURE — 81025 URINE PREGNANCY TEST: CPT | Performed by: NURSE PRACTITIONER

## 2023-07-08 PROCEDURE — 36415 COLL VENOUS BLD VENIPUNCTURE: CPT | Performed by: EMERGENCY MEDICINE

## 2023-07-08 PROCEDURE — 83690 ASSAY OF LIPASE: CPT | Performed by: NURSE PRACTITIONER

## 2023-07-08 PROCEDURE — 96374 THER/PROPH/DIAG INJ IV PUSH: CPT | Mod: 59 | Performed by: EMERGENCY MEDICINE

## 2023-07-08 RX ORDER — IOPAMIDOL 755 MG/ML
100 INJECTION, SOLUTION INTRAVASCULAR ONCE
Status: COMPLETED | OUTPATIENT
Start: 2023-07-08 | End: 2023-07-08

## 2023-07-08 RX ORDER — LANCETS
EACH MISCELLANEOUS
COMMUNITY
Start: 2023-06-19

## 2023-07-08 RX ORDER — BLOOD SUGAR DIAGNOSTIC
STRIP MISCELLANEOUS
COMMUNITY
Start: 2023-06-19

## 2023-07-08 RX ORDER — HYDROMORPHONE HYDROCHLORIDE 1 MG/ML
0.5 INJECTION, SOLUTION INTRAMUSCULAR; INTRAVENOUS; SUBCUTANEOUS ONCE
Status: COMPLETED | OUTPATIENT
Start: 2023-07-08 | End: 2023-07-08

## 2023-07-08 RX ORDER — ONDANSETRON 2 MG/ML
4 INJECTION INTRAMUSCULAR; INTRAVENOUS
Status: DISCONTINUED | OUTPATIENT
Start: 2023-07-08 | End: 2023-07-08 | Stop reason: HOSPADM

## 2023-07-08 RX ORDER — METFORMIN HCL 500 MG
500 TABLET, EXTENDED RELEASE 24 HR ORAL
COMMUNITY
Start: 2023-06-14

## 2023-07-08 RX ORDER — HYDROMORPHONE HCL IN WATER/PF 6 MG/30 ML
0.2 PATIENT CONTROLLED ANALGESIA SYRINGE INTRAVENOUS
Status: DISPENSED | OUTPATIENT
Start: 2023-07-08 | End: 2023-07-08

## 2023-07-08 RX ORDER — PEN NEEDLE, DIABETIC 32GX 5/32"
NEEDLE, DISPOSABLE MISCELLANEOUS
COMMUNITY
Start: 2023-06-21

## 2023-07-08 RX ADMIN — IOPAMIDOL 86 ML: 755 INJECTION, SOLUTION INTRAVENOUS at 15:32

## 2023-07-08 RX ADMIN — SODIUM CHLORIDE 64 ML: 9 INJECTION, SOLUTION INTRAVENOUS at 15:45

## 2023-07-08 RX ADMIN — HYDROMORPHONE HYDROCHLORIDE 0.5 MG: 1 INJECTION, SOLUTION INTRAMUSCULAR; INTRAVENOUS; SUBCUTANEOUS at 15:22

## 2023-07-08 RX ADMIN — ONDANSETRON 4 MG: 2 INJECTION INTRAMUSCULAR; INTRAVENOUS at 14:00

## 2023-07-08 RX ADMIN — HYDROMORPHONE HYDROCHLORIDE 0.2 MG: 0.2 INJECTION, SOLUTION INTRAMUSCULAR; INTRAVENOUS; SUBCUTANEOUS at 14:23

## 2023-07-08 RX ADMIN — SODIUM CHLORIDE 1000 ML: 9 INJECTION, SOLUTION INTRAVENOUS at 12:38

## 2023-07-08 ASSESSMENT — ACTIVITIES OF DAILY LIVING (ADL)
ADLS_ACUITY_SCORE: 35

## 2023-07-08 NOTE — MEDICATION SCRIBE - ADMISSION MEDICATION HISTORY
Medication Scribe Admission Medication History    Admission medication history is complete. The information provided in this note is only as accurate as the sources available at the time of the update.    Medication reconciliation/reorder completed by provider prior to medication history? No    Information Source(s): Patient and CareEverywhere/SureScripts via in-person    Pertinent Information: N/A    Changes made to PTA medication list:    Added: None    Deleted: None    Changed: None    Medication Affordability:       Allergies reviewed with patient and updates made in EHR: YES    Medication History Completed By: Maame Mari 7/8/2023 1:47 PM    Prior to Admission medications    Medication Sig Last Dose Taking? Auth Provider Long Term End Date   ACCU-CHEK GUIDE test strip 2-3 TIMES PER DAY TESTING 7/7/2023 Yes Reported, Patient     BD TERE U/F 32G X 4 MM insulin pen needle USE 1 NEEDLE PER DAY 7/7/2023 Yes Reported, Patient     blood glucose monitoring (SOFTCLIX) lancets TEST BLOOD GLUCOSE 2 TIME(S) PER DAY.  DISPENSE BRAND PER INSURANCE COVERAGE/PHARMACY CHOICE. 7/7/2023 Yes Reported, Patient     Insulin Glargine w/ Trans Port 100 UNIT/ML SOPN 20 units daily at 10 AM 7/7/2023 Yes Reported, Patient Yes    metFORMIN (GLUCOPHAGE XR) 500 MG 24 hr tablet Take 500 mg by mouth Past Week Yes Reported, Patient Yes    Cholecalciferol (VITAMIN D) 2000 UNITS CAPS Take 1 capsule by mouth daily Take one capsule daily.   Juliane Pack MD     Prenatal Vit-Fe Fumarate-FA (PRENATAL MULTIVITAMIN  PLUS IRON) 27-0.8 MG TABS Take 1 tablet by mouth daily   Juliane Pack MD

## 2023-07-08 NOTE — ED TRIAGE NOTES
Patient tells RN she has abdominal pain that began this morning. She started to get sweaty and weak, shaky and abdominal pain. Indicates the entire front of her abdomen. Patient denies vomiting, just has nausea. Also reports diarrhea.

## 2023-07-09 NOTE — ED NOTES
Pt discharged home with stool containers x2 and hat to collect. IV removed and AVS given. Pt to follow up with family MD in 3-4 days. Pt left ambulatory with good gait to exit.

## 2023-07-17 ENCOUNTER — MEDICAL CORRESPONDENCE (OUTPATIENT)
Dept: HEALTH INFORMATION MANAGEMENT | Facility: CLINIC | Age: 48
End: 2023-07-17
Payer: COMMERCIAL

## 2023-07-18 ENCOUNTER — TRANSCRIBE ORDERS (OUTPATIENT)
Dept: OTHER | Age: 48
End: 2023-07-18

## 2023-07-18 DIAGNOSIS — R10.84 ABDOMINAL PAIN, GENERALIZED: ICD-10-CM

## 2023-07-18 DIAGNOSIS — R19.5 LOOSE STOOLS: Primary | ICD-10-CM

## 2023-07-19 ENCOUNTER — TELEPHONE (OUTPATIENT)
Dept: GASTROENTEROLOGY | Facility: CLINIC | Age: 48
End: 2023-07-19
Payer: COMMERCIAL

## 2023-07-19 NOTE — TELEPHONE ENCOUNTER
Called and left voice message for Pt. Called Pt to help get them scheduled for a sooner GI appointment. Writer left call back number.

## 2023-07-19 NOTE — TELEPHONE ENCOUNTER
[facility] Call Center    Phone Message    May a detailed message be left on voicemail: Yes    Reason for Call: Other: Patient is currently scheduled on 8/23/23, as a patient New GI Urgent. This is outside the expected timeline for this schedule. Paitent has been added to the waitlist.      Action Taken: Message routed to:  Other: GI REFERRAL TRIAGE POOL     Travel Screening: Not Applicable

## 2023-07-25 NOTE — TELEPHONE ENCOUNTER
Pt is now scheduled within the appropriate time frame of one month for urgent triaged referral. No rescheduling is needed anymore. Closing encounter.

## 2023-08-04 NOTE — TELEPHONE ENCOUNTER
REFERRAL INFORMATION:  Referring Provider:  ELINA Zabala CNP  Referring Clinic:  [facility]   Reason for Visit/Diagnosis: Loose stools, Abdominal pain     FUTURE VISIT INFORMATION:  Appointment Date: 08-23-23  Appointment Time: @ 9:20am      NOTES STATUS DETAILS   OFFICE NOTE from Referring Provider Care Everywhere 07-18-23, 07-11-23 Lis ANTOINE CNP   OFFICE NOTE from Other Specialist NO    HOSPITAL DISCHARGE SUMMARY/  ED VISITS Internal 07-08-23 Dr. Joya Tomlin   OPERATIVE REPORT NO    MEDICATION LIST Internal         ENDOSCOPY  NO    COLONOSCOPY NO    ERCP NO    EUS NO    STOOL TESTING NO    PERTINENT LABS Internal/Care everywhere CMP: 07-08-23, 05-05-23, 08-06-21, 09-28-18  CBC/diff: 07-08-23, 08-06-21, 04-23-21, 01-11-19, 12-20-18   PATHOLOGY REPORTS (RELATED) NO    IMAGING (CT, MRI, EGD, MRCP, Small Bowel Follow Through/SBT, MR/CT Enterography) Internal CT abd/pelvis: 07-08-23

## 2023-08-15 ENCOUNTER — TELEPHONE (OUTPATIENT)
Dept: GASTROENTEROLOGY | Facility: CLINIC | Age: 48
End: 2023-08-15
Payer: COMMERCIAL

## 2023-08-15 NOTE — TELEPHONE ENCOUNTER
Called to remind patient of their upcoming appointment with our GI clinic, on Wednesday, August 23rd at 9:05am with Dr. Janet Ortiz. This appointment is scheduled as an in-person appt. Please arrive 15 minutes early to check in for your appointment. , if your appointment is virtual (video or telephone) you need to be in [place] for the visit. To reschedule or cancel patient to call [phone].    Izabella Laboy

## 2023-08-18 NOTE — TELEPHONE ENCOUNTER
Called via  line to remind patient of their upcoming appointment with our GI clinic, on Wednesday, August 23rd at 9:05am with Dr. Janet Ortiz. This appointment is scheduled as an in-person appt. Please arrive 15 minutes early to check in for your appointment. , if your appointment is virtual (video or telephone) you need to be in [place] for the visit. To reschedule or cancel patient to call [phone].     Izabella Laboy

## 2023-08-23 ENCOUNTER — PRE VISIT (OUTPATIENT)
Dept: GASTROENTEROLOGY | Facility: CLINIC | Age: 48
End: 2023-08-23

## 2025-01-02 NOTE — ED PROVIDER NOTES
"    [facility] EMERGENCY DEPARTMENT ([place] [facility])    7/08/23      ED PROVIDER NOTE     History     Chief Complaint   Patient presents with     Abdominal Pain     HPI  Tricia Tamez is a 48 year old Oromo speaking female with history of generalized anxiety disorder, type 2 diabetes who presents with abdominal pain and fatigue.  Patient reports her pain started suddenly earlier today and has not improved.  Reports diffuse pain throughout her abdomen worse in her lower abdomen.  She reports some nausea and diarrhea, no emesis, no hematochezia or hematic emesis.  States she has never experienced anything like this before.  Denies any chest pain or shortness of breath.  Denies dysuria.    Past Medical History  No past medical history on file.  No past surgical history on file.  ACCU-CHEK GUIDE test strip  BD TERE U/F 32G X 4 MM insulin pen needle  blood glucose monitoring (SOFTCLIX) lancets  Insulin Glargine w/ Trans Port 100 UNIT/ML SOPN  metFORMIN (GLUCOPHAGE XR) 500 MG 24 hr tablet  Cholecalciferol (VITAMIN D) 2000 UNITS CAPS  Prenatal Vit-Fe Fumarate-FA (PRENATAL MULTIVITAMIN  PLUS IRON) 27-0.8 MG TABS      No Known Allergies  Family History  No family history on file.  Social History   Social History     Tobacco Use     Smoking status: Never     Smokeless tobacco: Never   Substance Use Topics     Alcohol use: No     Drug use: No         A medically appropriate review of systems was performed with pertinent positives and negatives noted in the HPI, and all other systems negative.    Physical Exam   BP: 129/74  Pulse: 89  Temp: 97.8  F (36.6  C)  Resp: 20  Height: 165.1 cm (5' 5\")  Weight: 87.5 kg (193 lb)  SpO2: 100 %  Physical Exam  Constitutional:       Appearance: She is well-developed. She is ill-appearing.      Comments: Ill-appearing, curled in fetal position lying on the cot   HENT:      Head: Normocephalic and atraumatic.   Cardiovascular:      Rate and Rhythm: Normal rate and regular rhythm.      Heart " The patient called for refill the medication .   sounds: Normal heart sounds.   Pulmonary:      Effort: Pulmonary effort is normal.      Breath sounds: Normal breath sounds.   Abdominal:      General: Abdomen is flat. There is no distension.      Tenderness: There is generalized abdominal tenderness. There is no right CVA tenderness, left CVA tenderness or rebound.   Skin:     General: Skin is warm and dry.      Capillary Refill: Capillary refill takes less than 2 seconds.   Neurological:      General: No focal deficit present.      Mental Status: She is alert and oriented to person, place, and time.   Psychiatric:         Mood and Affect: Mood normal.         Behavior: Behavior normal.           ED Course, Procedures, & Data      Procedures            Results for orders placed or performed during the hospital encounter of 07/08/23   CT Abdomen Pelvis w Contrast     Status: None    Narrative    EXAM: CT ABDOMEN PELVIS W CONTRAST  LOCATION: [facility]  DATE: 7/8/2023    INDICATION: abdominal pain, leukocytosis  COMPARISON: None.  TECHNIQUE: CT scan of the abdomen and pelvis was performed following injection of IV contrast. Multiplanar reformats were obtained. Dose reduction techniques were used.  CONTRAST: 86mL isovue 370    FINDINGS:   LOWER CHEST: Normal.    HEPATOBILIARY: Normal.    PANCREAS: Normal.    SPLEEN: Normal.    ADRENAL GLANDS: Normal.    KIDNEYS/BLADDER: Small simple right renal cyst. No ureteral calculi. No hydronephrosis or hydroureter. Normal urinary bladder.     BOWEL: Normal stomach. Normal caliber of the small bowel. Normal appendix. There is wall thickening of the nondistended descending and sigmoid colon. Otherwise the colon is normal in appearance.     LYMPH NODES: Normal.    VASCULATURE: Unremarkable.    PELVIC ORGANS: Normal.    MUSCULOSKELETAL: Fat-containing umbilical hernia.       Impression    IMPRESSION:   1.  Wall thickening of the descending and sigmoid colon may be from underdistention  or an infectious or inflammatory colitis. Otherwise normal appearance of the bowel.   Comprehensive metabolic panel     Status: Abnormal   Result Value Ref Range    Sodium 132 (L) 136 - 145 mmol/L    Potassium 4.2 3.4 - 5.3 mmol/L    Chloride 96 (L) 98 - 107 mmol/L    Carbon Dioxide (CO2) 24 22 - 29 mmol/L    Anion Gap 12 7 - 15 mmol/L    Urea Nitrogen 11.8 6.0 - 20.0 mg/dL    Creatinine 0.50 (L) 0.51 - 0.95 mg/dL    Calcium 10.1 (H) 8.6 - 10.0 mg/dL    Glucose 286 (H) 70 - 99 mg/dL    Alkaline Phosphatase 90 35 - 104 U/L    AST 19 0 - 45 U/L    ALT 30 0 - 50 U/L    Protein Total 7.9 6.4 - 8.3 g/dL    Albumin 4.5 3.5 - 5.2 g/dL    Bilirubin Total 0.3 <=1.2 mg/dL    GFR Estimate >90 >60 mL/min/1.73m2   UA with Microscopic reflex to Culture     Status: Abnormal    Specimen: Urine, Clean Catch   Result Value Ref Range    Color Urine Yellow Colorless, Straw, Light Yellow, Yellow    Appearance Urine Clear Clear    Glucose Urine >=1000 (A) Negative mg/dL    Bilirubin Urine Negative Negative    Ketones Urine 20 (A) Negative mg/dL    Specific Gravity Urine 1.019 1.003 - 1.035    Blood Urine Negative Negative    pH Urine 5.0 5.0 - 7.0    Protein Albumin Urine Negative Negative mg/dL    Urobilinogen Urine Normal Normal, 2.0 mg/dL    Nitrite Urine Negative Negative    Leukocyte Esterase Urine Negative Negative    Mucus Urine Present (A) None Seen /LPF    RBC Urine <1 <=2 /HPF    WBC Urine 3 <=5 /HPF    Squamous Epithelials Urine 1 <=1 /HPF    Narrative    Urine Culture not indicated   CBC with platelets and differential     Status: Abnormal   Result Value Ref Range    WBC Count 13.4 (H) 4.0 - 11.0 10e3/uL    RBC Count 4.94 3.80 - 5.20 10e6/uL    Hemoglobin 14.8 11.7 - 15.7 g/dL    Hematocrit 42.8 35.0 - 47.0 %    MCV 87 78 - 100 fL    MCH 30.0 26.5 - 33.0 pg    MCHC 34.6 31.5 - 36.5 g/dL    RDW 12.1 10.0 - 15.0 %    Platelet Count 300 150 - 450 10e3/uL    % Neutrophils 78 %    % Lymphocytes 17 %    % Monocytes 4 %    %  Eosinophils 1 %    % Basophils 0 %    % Immature Granulocytes 0 %    NRBCs per 100 WBC 0 <1 /100    Absolute Neutrophils 10.4 (H) 1.6 - 8.3 10e3/uL    Absolute Lymphocytes 2.2 0.8 - 5.3 10e3/uL    Absolute Monocytes 0.6 0.0 - 1.3 10e3/uL    Absolute Eosinophils 0.1 0.0 - 0.7 10e3/uL    Absolute Basophils 0.1 0.0 - 0.2 10e3/uL    Absolute Immature Granulocytes 0.0 <=0.4 10e3/uL    Absolute NRBCs 0.0 10e3/uL   Lipase     Status: Normal   Result Value Ref Range    Lipase 26 13 - 60 U/L   HCG qualitative urine     Status: Normal   Result Value Ref Range    hCG Urine Qualitative Negative Negative   CBC with platelets differential     Status: Abnormal    Narrative    The following orders were created for panel order CBC with platelets differential.  Procedure                               Abnormality         Status                     ---------                               -----------         ------                     CBC with platelets and d...[[id number]]  Abnormal            Final result                 Please view results for these tests on the individual orders.     Medications   HYDROmorphone (DILAUDID) injection 0.2 mg (0.2 mg Intravenous $Given 7/8/23 1423)   0.9% sodium chloride BOLUS (0 mLs Intravenous Stopped 7/8/23 1339)   HYDROmorphone (PF) (DILAUDID) injection 0.5 mg (0.5 mg Intravenous $Given 7/8/23 1522)   iopamidol (ISOVUE-370) solution 100 mL (86 mLs Intravenous $Given 7/8/23 1532)   sodium chloride 100mL for CT scan flush use (64 mLs Intravenous $Given 7/8/23 1545)     Labs Ordered and Resulted from Time of ED Arrival to Time of ED Departure   COMPREHENSIVE METABOLIC PANEL - Abnormal       Result Value    Sodium 132 (*)     Potassium 4.2      Chloride 96 (*)     Carbon Dioxide (CO2) 24      Anion Gap 12      Urea Nitrogen 11.8      Creatinine 0.50 (*)     Calcium 10.1 (*)     Glucose 286 (*)     Alkaline Phosphatase 90      AST 19      ALT 30      Protein Total 7.9      Albumin 4.5      Bilirubin Total  0.3      GFR Estimate >90     ROUTINE UA WITH MICROSCOPIC REFLEX TO CULTURE - Abnormal    Color Urine Yellow      Appearance Urine Clear      Glucose Urine >=1000 (*)     Bilirubin Urine Negative      Ketones Urine 20 (*)     Specific Gravity Urine 1.019      Blood Urine Negative      pH Urine 5.0      Protein Albumin Urine Negative      Urobilinogen Urine Normal      Nitrite Urine Negative      Leukocyte Esterase Urine Negative      Mucus Urine Present (*)     RBC Urine <1      WBC Urine 3      Squamous Epithelials Urine 1     CBC WITH PLATELETS AND DIFFERENTIAL - Abnormal    WBC Count 13.4 (*)     RBC Count 4.94      Hemoglobin 14.8      Hematocrit 42.8      MCV 87      MCH 30.0      MCHC 34.6      RDW 12.1      Platelet Count 300      % Neutrophils 78      % Lymphocytes 17      % Monocytes 4      % Eosinophils 1      % Basophils 0      % Immature Granulocytes 0      NRBCs per 100 WBC 0      Absolute Neutrophils 10.4 (*)     Absolute Lymphocytes 2.2      Absolute Monocytes 0.6      Absolute Eosinophils 0.1      Absolute Basophils 0.1      Absolute Immature Granulocytes 0.0      Absolute NRBCs 0.0     LIPASE - Normal    Lipase 26     HCG QUALITATIVE URINE - Normal    hCG Urine Qualitative Negative       CT Abdomen Pelvis w Contrast   Final Result   IMPRESSION:    1.  Wall thickening of the descending and sigmoid colon may be from underdistention or an infectious or inflammatory colitis. Otherwise normal appearance of the bowel.             Critical care was not performed.     Medical Decision Making  The patient's presentation was of moderate complexity (an acute illness with systemic symptoms).    The patient's evaluation involved:  review of external note(s) from 3+ sources (see separate area of note for details)  ordering and/or review of 3+ test(s) in this encounter (see separate area of note for details)  review of 3+ test result(s) ordered prior to this encounter (see separate area of note for  details)  independent interpretation of testing performed by another health professional (CT abd/pelvis)    The patient's management necessitated moderate risk (prescription drug management including medications given in the ED) and high risk (a parenteral controlled substance).      Assessment & Plan    Tricia Tamez is a 48 year old Oromo speaking female with history of generalized anxiety disorder, type 2 diabetes who presents with abdominal pain, fatigue and nausea.  Over the emergency room patient is very uncomfortable appearing, curled up in the fetal position lying on the cot.  Will obtain labs as well as urine sample, will plan to start with IV fluids, Zofran and a small dose of Dilaudid.    Upon reassessment, patient reports pain improved somewhat.  Patient given second dose of Dilaudid.  I reviewed the labs which were significant for a mild leukocytosis, mild hyponatremia 132 as well as a mild hypercalcemia 10.1 and a significantly elevated glucose at 286.  Urinalysis positive for glycosuria, ketonuria and mucus, negative for infection.  Lipase was normal, UPT.  As patient's symptoms still persisting and no clear answer based on labs and urinalysis will send patient for abdominal CT.  I discussed this with the patient and her family member who are in agreement.     I reviewed the CT imaging as well as read the radiologist report which showed some wall thickening of the descending and sigmoid colon potentially related to an infectious or inflammatory most likely viral.  No other explanation of patient's symptoms found on CT imaging.    Patient reported her symptoms had improved since receiving the second dose of Dilaudid.  Discussed with patient importance of following up with her primary care provider and diabetes management as she states she has been prescribed insulin but has yet to pick it up.  I did encourage her to pick this up and start using it, if she would did not feel confident in her ability to  do so she should visit her clinic for additional diabetes education.    Discussed that most likely etiology of colitis is likely viral, treatment is symptomatic.  Provided patient with information about colitis and recommended she follow-up with her primary care provider in 3 to 4 days.    I have reviewed the nursing notes. I have reviewed the findings, diagnosis, plan and need for follow up with the patient.  Patient at this point felt comfortable to be discharged home.    Discharge Medication List as of 7/8/2023  7:17 PM          Final diagnoses:   Colitis       ELINA Bob [facility] EMERGENCY DEPARTMENT  7/8/2023     Shonda Doherty APRN CNP  07/09/23 1417       Joya Tomlin MD  07/09/23 1500

## 2025-07-23 ENCOUNTER — TRANSCRIBE ORDERS (OUTPATIENT)
Dept: OTHER | Age: 50
End: 2025-07-23

## 2025-07-23 ENCOUNTER — MEDICAL CORRESPONDENCE (OUTPATIENT)
Dept: HEALTH INFORMATION MANAGEMENT | Facility: CLINIC | Age: 50
End: 2025-07-23
Payer: COMMERCIAL

## 2025-07-23 DIAGNOSIS — Z79.4 TYPE 2 DIABETES MELLITUS WITHOUT COMPLICATION, WITH LONG-TERM CURRENT USE OF INSULIN (H): Primary | ICD-10-CM

## 2025-07-23 DIAGNOSIS — E11.9 TYPE 2 DIABETES MELLITUS WITHOUT COMPLICATION, WITH LONG-TERM CURRENT USE OF INSULIN (H): Primary | ICD-10-CM
